# Patient Record
Sex: MALE | Race: WHITE | NOT HISPANIC OR LATINO | Employment: FULL TIME | URBAN - METROPOLITAN AREA
[De-identification: names, ages, dates, MRNs, and addresses within clinical notes are randomized per-mention and may not be internally consistent; named-entity substitution may affect disease eponyms.]

---

## 2024-03-29 DIAGNOSIS — I10 HTN (HYPERTENSION): Primary | ICD-10-CM

## 2024-03-29 RX ORDER — POTASSIUM CHLORIDE 20 MEQ/1
20 TABLET, EXTENDED RELEASE ORAL DAILY
Qty: 90 TABLET | Refills: 1 | Status: SHIPPED | OUTPATIENT
Start: 2024-03-29 | End: 2024-04-01 | Stop reason: SDUPTHER

## 2024-03-29 RX ORDER — HYDROCHLOROTHIAZIDE 25 MG/1
25 TABLET ORAL DAILY
Qty: 90 TABLET | Refills: 1 | Status: SHIPPED | OUTPATIENT
Start: 2024-03-29 | End: 2024-04-01 | Stop reason: SDUPTHER

## 2024-04-01 ENCOUNTER — OFFICE VISIT (OUTPATIENT)
Dept: PRIMARY CARE | Facility: CLINIC | Age: 64
End: 2024-04-01
Payer: COMMERCIAL

## 2024-04-01 VITALS
RESPIRATION RATE: 14 BRPM | BODY MASS INDEX: 36.29 KG/M2 | WEIGHT: 245 LBS | SYSTOLIC BLOOD PRESSURE: 145 MMHG | HEART RATE: 76 BPM | HEIGHT: 69 IN | DIASTOLIC BLOOD PRESSURE: 79 MMHG

## 2024-04-01 DIAGNOSIS — Z12.11 COLON CANCER SCREENING: ICD-10-CM

## 2024-04-01 DIAGNOSIS — E66.01 MORBID OBESITY (MULTI): ICD-10-CM

## 2024-04-01 DIAGNOSIS — I10 PRIMARY HYPERTENSION: Primary | ICD-10-CM

## 2024-04-01 DIAGNOSIS — Z72.0 TOBACCO USE: ICD-10-CM

## 2024-04-01 DIAGNOSIS — R06.83 SNORES: ICD-10-CM

## 2024-04-01 PROCEDURE — 3078F DIAST BP <80 MM HG: CPT | Performed by: FAMILY MEDICINE

## 2024-04-01 PROCEDURE — 3077F SYST BP >= 140 MM HG: CPT | Performed by: FAMILY MEDICINE

## 2024-04-01 PROCEDURE — 99214 OFFICE O/P EST MOD 30 MIN: CPT | Performed by: FAMILY MEDICINE

## 2024-04-01 RX ORDER — HYDROCHLOROTHIAZIDE 25 MG/1
25 TABLET ORAL DAILY
Qty: 90 TABLET | Refills: 1 | Status: SHIPPED | OUTPATIENT
Start: 2024-04-01 | End: 2024-05-01 | Stop reason: SDUPTHER

## 2024-04-01 RX ORDER — POTASSIUM CHLORIDE 20 MEQ/1
20 TABLET, EXTENDED RELEASE ORAL DAILY
Qty: 90 TABLET | Refills: 1 | Status: SHIPPED | OUTPATIENT
Start: 2024-04-01 | End: 2024-05-01 | Stop reason: SDUPTHER

## 2024-04-01 NOTE — ASSESSMENT & PLAN NOTE
BP has not been controlled. Start  BP pill as dir. keep a daily  bp log and bring in the log at the next office visit. Call office if BP is persistently over 130/80. DASH diet and regular exercise. Decrease calorie intake to lose wt.

## 2024-04-01 NOTE — PROGRESS NOTES
"Subjective   Patient ID: Jeb Lobo is a 63 y.o. male who presents for fu    HPI   Recent bp was elevated at 160's systolic. No CP, HA, dizziness, heart palpitation. No claudication or cold LE.  Stable  LE edema. No imbalance or falls. Good mood. Pt snores at night. Pt cont to smoke. No cough, sob or hemoptysis    Review of Systems    Objective   /79   Pulse 76   Resp 14   Ht 1.753 m (5' 9\")   Wt 111 kg (245 lb)   BMI 36.18 kg/m²     Physical Exam  NAD, well groomed, No sclera icterus. neck: supple, no cervical or axillary lymphadenopathy,  lungs: CTA b/l, heart: RRR, No LE edema, normal pedal pulses, abd: soft, no tenderness, BS+,   Good balance. CNII-XII were grossly intact, good judgment and memory. No depressed mood.   Assessment/Plan   Problem List Items Addressed This Visit             ICD-10-CM    Primary hypertension - Primary I10     BP has not been controlled. Start  BP pill as dir. keep a daily  bp log and bring in the log at the next office visit. Call office if BP is persistently over 130/80. DASH diet and regular exercise. Decrease calorie intake to lose wt.             Relevant Medications    hydroCHLOROthiazide (HYDRODiuril) 25 mg tablet    potassium chloride CR 20 mEq ER tablet    Other Relevant Orders    CBC    Comprehensive Metabolic Panel    Lipid Panel    TSH with reflex to Free T4 if abnormal    Urinalysis with Reflex Microscopic    ECG 12 lead (Ancillary Performed)    Tobacco use Z72.0     Nicotine dependence counseling: patient  smokes cigarettes. Pt declined ldct.  I discussed with patient that  tobacco addiction increases the risks of COPD (emphysema), heart attack, stroke, Peripheral artery disease, and cancer etc. Treatment options such as behavioral counseling (specialty clinic, smoking cessation program, 1-800-QUIT-NOW) and medications (Zyban, chantix and Nicotine replacement therapy) were  discussed with the patient who is considering to quit. Nicotine withdrawal symptoms " such as  increased appetite and weight gain, changes in mood (dysphoria or depression), insomnia, irritability, anxiety, difficulty concentrating and restlessness were discussed with patient. Inform patient that Nicotine withdrawal symptoms peak in the first three days of quitting and subside over three to four weeks.          Morbid obesity (CMS/HCC) E66.01     Recommend regular aerobic exercise with low fat and low cholesterol diet. Will monitor weight, blood glucose and cholesterol regularly. Pt declined bariatric evaluation           Snores R06.83    Relevant Orders    Referral to Adult Sleep Medicine     Other Visit Diagnoses         Codes    Colon cancer screening     Z12.11    Relevant Orders    Fecal Occult Blood Immunoassy

## 2024-04-01 NOTE — ASSESSMENT & PLAN NOTE
Nicotine dependence counseling: patient  smokes cigarettes. Pt declined ldct.  I discussed with patient that  tobacco addiction increases the risks of COPD (emphysema), heart attack, stroke, Peripheral artery disease, and cancer etc. Treatment options such as behavioral counseling (specialty clinic, smoking cessation program, 1-800-QUIT-NOW) and medications (Zyban, chantix and Nicotine replacement therapy) were  discussed with the patient who is considering to quit. Nicotine withdrawal symptoms such as  increased appetite and weight gain, changes in mood (dysphoria or depression), insomnia, irritability, anxiety, difficulty concentrating and restlessness were discussed with patient. Inform patient that Nicotine withdrawal symptoms peak in the first three days of quitting and subside over three to four weeks.

## 2024-04-04 ENCOUNTER — LAB (OUTPATIENT)
Dept: LAB | Facility: LAB | Age: 64
End: 2024-04-04
Payer: COMMERCIAL

## 2024-04-04 DIAGNOSIS — I10 PRIMARY HYPERTENSION: ICD-10-CM

## 2024-04-04 LAB
ALBUMIN SERPL BCP-MCNC: 4.4 G/DL (ref 3.4–5)
ALP SERPL-CCNC: 79 U/L (ref 33–136)
ALT SERPL W P-5'-P-CCNC: 24 U/L (ref 10–52)
ANION GAP SERPL CALC-SCNC: 12 MMOL/L (ref 10–20)
APPEARANCE UR: CLEAR
AST SERPL W P-5'-P-CCNC: 16 U/L (ref 9–39)
BILIRUB SERPL-MCNC: 0.6 MG/DL (ref 0–1.2)
BILIRUB UR STRIP.AUTO-MCNC: NEGATIVE MG/DL
BUN SERPL-MCNC: 19 MG/DL (ref 6–23)
CALCIUM SERPL-MCNC: 9.5 MG/DL (ref 8.6–10.3)
CHLORIDE SERPL-SCNC: 100 MMOL/L (ref 98–107)
CHOLEST SERPL-MCNC: 191 MG/DL (ref 0–199)
CHOLESTEROL/HDL RATIO: 5
CO2 SERPL-SCNC: 28 MMOL/L (ref 21–32)
COLOR UR: YELLOW
CREAT SERPL-MCNC: 1.28 MG/DL (ref 0.5–1.3)
EGFRCR SERPLBLD CKD-EPI 2021: 62 ML/MIN/1.73M*2
ERYTHROCYTE [DISTWIDTH] IN BLOOD BY AUTOMATED COUNT: 15.3 % (ref 11.5–14.5)
GLUCOSE SERPL-MCNC: 100 MG/DL (ref 74–99)
GLUCOSE UR STRIP.AUTO-MCNC: NEGATIVE MG/DL
HCT VFR BLD AUTO: 56.9 % (ref 41–52)
HDLC SERPL-MCNC: 38.5 MG/DL
HGB BLD-MCNC: 19.8 G/DL (ref 13.5–17.5)
KETONES UR STRIP.AUTO-MCNC: NEGATIVE MG/DL
LDLC SERPL CALC-MCNC: 123 MG/DL
LEUKOCYTE ESTERASE UR QL STRIP.AUTO: NEGATIVE
MCH RBC QN AUTO: 31.5 PG (ref 26–34)
MCHC RBC AUTO-ENTMCNC: 34.8 G/DL (ref 32–36)
MCV RBC AUTO: 91 FL (ref 80–100)
NITRITE UR QL STRIP.AUTO: NEGATIVE
NON HDL CHOLESTEROL: 153 MG/DL (ref 0–149)
NRBC BLD-RTO: 0 /100 WBCS (ref 0–0)
PH UR STRIP.AUTO: 5 [PH]
PLATELET # BLD AUTO: 266 X10*3/UL (ref 150–450)
POTASSIUM SERPL-SCNC: 4.1 MMOL/L (ref 3.5–5.3)
PROT SERPL-MCNC: 7.2 G/DL (ref 6.4–8.2)
PROT UR STRIP.AUTO-MCNC: NEGATIVE MG/DL
RBC # BLD AUTO: 6.28 X10*6/UL (ref 4.5–5.9)
RBC # UR STRIP.AUTO: NEGATIVE /UL
SODIUM SERPL-SCNC: 136 MMOL/L (ref 136–145)
SP GR UR STRIP.AUTO: 1.02
TRIGL SERPL-MCNC: 149 MG/DL (ref 0–149)
TSH SERPL-ACNC: 1.5 MIU/L (ref 0.44–3.98)
UROBILINOGEN UR STRIP.AUTO-MCNC: <2 MG/DL
VLDL: 30 MG/DL (ref 0–40)
WBC # BLD AUTO: 11.2 X10*3/UL (ref 4.4–11.3)

## 2024-04-04 PROCEDURE — 36415 COLL VENOUS BLD VENIPUNCTURE: CPT

## 2024-04-04 PROCEDURE — 80053 COMPREHEN METABOLIC PANEL: CPT

## 2024-04-04 PROCEDURE — 85027 COMPLETE CBC AUTOMATED: CPT

## 2024-04-04 PROCEDURE — 80061 LIPID PANEL: CPT

## 2024-04-04 PROCEDURE — 84443 ASSAY THYROID STIM HORMONE: CPT

## 2024-04-04 PROCEDURE — 81003 URINALYSIS AUTO W/O SCOPE: CPT

## 2024-04-08 ENCOUNTER — OFFICE VISIT (OUTPATIENT)
Dept: PRIMARY CARE | Facility: CLINIC | Age: 64
End: 2024-04-08
Payer: COMMERCIAL

## 2024-04-08 ENCOUNTER — APPOINTMENT (OUTPATIENT)
Dept: PRIMARY CARE | Facility: CLINIC | Age: 64
End: 2024-04-08
Payer: COMMERCIAL

## 2024-04-08 VITALS — HEART RATE: 72 BPM | DIASTOLIC BLOOD PRESSURE: 81 MMHG | SYSTOLIC BLOOD PRESSURE: 135 MMHG

## 2024-04-08 DIAGNOSIS — D75.1 ERYTHROCYTOSIS: ICD-10-CM

## 2024-04-08 DIAGNOSIS — E78.00 PURE HYPERCHOLESTEROLEMIA: Primary | ICD-10-CM

## 2024-04-08 PROCEDURE — 99214 OFFICE O/P EST MOD 30 MIN: CPT | Performed by: FAMILY MEDICINE

## 2024-04-08 PROCEDURE — 3079F DIAST BP 80-89 MM HG: CPT | Performed by: FAMILY MEDICINE

## 2024-04-08 PROCEDURE — 3075F SYST BP GE 130 - 139MM HG: CPT | Performed by: FAMILY MEDICINE

## 2024-04-08 RX ORDER — ROSUVASTATIN CALCIUM 40 MG/1
40 TABLET, COATED ORAL DAILY
Qty: 100 TABLET | Refills: 3 | Status: SHIPPED | OUTPATIENT
Start: 2024-04-08 | End: 2025-05-13

## 2024-04-08 NOTE — ASSESSMENT & PLAN NOTE
Hyperlipidemia start  statin. No GI upset or muscle ache. Will monitor labs for evaluation.  Health diet and regular exercise. Decrease calorie intake to lose wt.  f/u in 3 mos.

## 2024-04-08 NOTE — PROGRESS NOTES
Subjective   Patient ID: Jeb Lobo is a 63 y.o. male who presents for lab fu    HPI   H/H was elevated. LDL was high too. No sob, cough, CP, HA, dizziness, heart palpitation. No claudication or cold LE.  No LE edema. No imbalance or falls. Good mood.     Review of Systems    Objective   /81   Pulse 72     Physical Exam  NAD, well groomed, no cervical or axillary lymphadenopathy,  lungs: CTA b/l, heart: RRR, No LE edema, normal pedal pulses, abd: soft, no tenderness, BS+,  Good balance. CNII-XII were grossly intact, good mood.    Assessment/Plan   Problem List Items Addressed This Visit             ICD-10-CM    Pure hypercholesterolemia - Primary E78.00     Hyperlipidemia start  statin. No GI upset or muscle ache. Will monitor labs for evaluation.  Health diet and regular exercise. Decrease calorie intake to lose wt.  f/u in 3 mos.            Relevant Medications    rosuvastatin (Crestor) 40 mg tablet    Erythrocytosis D75.1     H/H was higher then before. Dc smoking. Hematology eval         Relevant Orders    Referral to Hematology and Oncology

## 2024-05-01 ENCOUNTER — OFFICE VISIT (OUTPATIENT)
Dept: PRIMARY CARE | Facility: CLINIC | Age: 64
End: 2024-05-01
Payer: COMMERCIAL

## 2024-05-01 VITALS — HEART RATE: 78 BPM | DIASTOLIC BLOOD PRESSURE: 76 MMHG | SYSTOLIC BLOOD PRESSURE: 132 MMHG

## 2024-05-01 DIAGNOSIS — I10 PRIMARY HYPERTENSION: Primary | ICD-10-CM

## 2024-05-01 DIAGNOSIS — Z12.11 COLON CANCER SCREENING: ICD-10-CM

## 2024-05-01 PROCEDURE — 3075F SYST BP GE 130 - 139MM HG: CPT | Performed by: FAMILY MEDICINE

## 2024-05-01 PROCEDURE — 99213 OFFICE O/P EST LOW 20 MIN: CPT | Performed by: FAMILY MEDICINE

## 2024-05-01 PROCEDURE — 3078F DIAST BP <80 MM HG: CPT | Performed by: FAMILY MEDICINE

## 2024-05-01 RX ORDER — HYDROCHLOROTHIAZIDE 25 MG/1
25 TABLET ORAL DAILY
Qty: 90 TABLET | Refills: 1 | Status: SHIPPED | OUTPATIENT
Start: 2024-05-01

## 2024-05-01 RX ORDER — POTASSIUM CHLORIDE 20 MEQ/1
20 TABLET, EXTENDED RELEASE ORAL DAILY
Qty: 90 TABLET | Refills: 1 | Status: SHIPPED | OUTPATIENT
Start: 2024-05-01

## 2024-05-01 NOTE — PROGRESS NOTES
Subjective   Patient ID: Jeb Lobo is a 63 y.o. male who presents for bp fu    HPI   Patient has been compliant with taking all  current medications.No CP, HA, dizziness, heart palpitation. No claudication or cold LE.  No LE edema.   Review of Systems    Objective   /76   Pulse 78     Physical Exam  NAD, well groomed, No sclera icterus.   lungs: CTA b/l, heart: RRR, No LE edema, normal pedal pulses, abd: soft, no tenderness, BS+,Good balance. Assessment/Plan   Problem List Items Addressed This Visit             ICD-10-CM    Primary hypertension I10      DASH diet and regular exercise. Decrease calorie intake to lose wt.   Fu in 3 mos           Relevant Medications    hydroCHLOROthiazide (HYDRODiuril) 25 mg tablet    potassium chloride CR 20 mEq ER tablet    Other Relevant Orders    CBC     Other Visit Diagnoses         Codes    Colon cancer screening    -  Primary Z12.11    Relevant Orders    Cologuard® colon cancer screening

## 2024-05-02 ENCOUNTER — APPOINTMENT (OUTPATIENT)
Dept: PRIMARY CARE | Facility: CLINIC | Age: 64
End: 2024-05-02
Payer: COMMERCIAL

## 2024-05-06 ENCOUNTER — LAB (OUTPATIENT)
Dept: LAB | Facility: LAB | Age: 64
End: 2024-05-06
Payer: COMMERCIAL

## 2024-05-06 DIAGNOSIS — I10 PRIMARY HYPERTENSION: ICD-10-CM

## 2024-05-06 LAB
ERYTHROCYTE [DISTWIDTH] IN BLOOD BY AUTOMATED COUNT: 13.9 % (ref 11.5–14.5)
HCT VFR BLD AUTO: 55.4 % (ref 41–52)
HGB BLD-MCNC: 18.3 G/DL (ref 13.5–17.5)
MCH RBC QN AUTO: 30.2 PG (ref 26–34)
MCHC RBC AUTO-ENTMCNC: 33 G/DL (ref 32–36)
MCV RBC AUTO: 91 FL (ref 80–100)
NRBC BLD-RTO: 0 /100 WBCS (ref 0–0)
PLATELET # BLD AUTO: 291 X10*3/UL (ref 150–450)
RBC # BLD AUTO: 6.06 X10*6/UL (ref 4.5–5.9)
WBC # BLD AUTO: 11.8 X10*3/UL (ref 4.4–11.3)

## 2024-05-06 PROCEDURE — 36415 COLL VENOUS BLD VENIPUNCTURE: CPT

## 2024-05-06 PROCEDURE — 85027 COMPLETE CBC AUTOMATED: CPT

## 2024-06-14 ENCOUNTER — TELEPHONE (OUTPATIENT)
Dept: PRIMARY CARE | Facility: CLINIC | Age: 64
End: 2024-06-14
Payer: COMMERCIAL

## 2024-06-14 DIAGNOSIS — Z72.0 TOBACCO USE: Primary | ICD-10-CM

## 2024-06-14 RX ORDER — IBUPROFEN 200 MG
1 TABLET ORAL EVERY 24 HOURS
Qty: 30 PATCH | Refills: 0 | Status: SHIPPED | OUTPATIENT
Start: 2024-06-14 | End: 2024-07-14

## 2024-06-28 ENCOUNTER — APPOINTMENT (OUTPATIENT)
Dept: HEMATOLOGY/ONCOLOGY | Facility: CLINIC | Age: 64
End: 2024-06-28
Payer: COMMERCIAL

## 2024-07-08 ENCOUNTER — APPOINTMENT (OUTPATIENT)
Dept: PRIMARY CARE | Facility: CLINIC | Age: 64
End: 2024-07-08
Payer: COMMERCIAL

## 2024-07-08 VITALS — DIASTOLIC BLOOD PRESSURE: 65 MMHG | SYSTOLIC BLOOD PRESSURE: 128 MMHG | HEART RATE: 72 BPM

## 2024-07-08 DIAGNOSIS — Z72.0 TOBACCO USE: ICD-10-CM

## 2024-07-08 DIAGNOSIS — D75.1 ERYTHROCYTOSIS: ICD-10-CM

## 2024-07-08 DIAGNOSIS — I10 PRIMARY HYPERTENSION: ICD-10-CM

## 2024-07-08 DIAGNOSIS — E78.00 PURE HYPERCHOLESTEROLEMIA: Primary | ICD-10-CM

## 2024-07-08 PROCEDURE — 3074F SYST BP LT 130 MM HG: CPT | Performed by: FAMILY MEDICINE

## 2024-07-08 PROCEDURE — 99214 OFFICE O/P EST MOD 30 MIN: CPT | Performed by: FAMILY MEDICINE

## 2024-07-08 PROCEDURE — 3078F DIAST BP <80 MM HG: CPT | Performed by: FAMILY MEDICINE

## 2024-07-08 RX ORDER — HYDROCHLOROTHIAZIDE 25 MG/1
25 TABLET ORAL DAILY
Qty: 90 TABLET | Refills: 1 | Status: SHIPPED | OUTPATIENT
Start: 2024-07-08

## 2024-07-08 RX ORDER — POTASSIUM CHLORIDE 20 MEQ/1
20 TABLET, EXTENDED RELEASE ORAL DAILY
Qty: 90 TABLET | Refills: 1 | Status: SHIPPED | OUTPATIENT
Start: 2024-07-08

## 2024-07-08 NOTE — ASSESSMENT & PLAN NOTE
Hyperlipidemia, not  on statin. Will monitor labs for evaluation.  Health diet and regular exercise. Decrease calorie intake to lose wt.  f/u in 3 mos.

## 2024-07-08 NOTE — PROGRESS NOTES
Subjective   Patient ID: Jeb Lobo is a 63 y.o. male who presents for fu    HPI   Patient has been compliant with taking all  current medications except crestor.  Normal appetite. Pt cont to smoke. No sob, cough, CP, HA, dizziness, heart palpitation. No claudication or cold LE.  No LE edema. No imbalance or falls. Good mood.     Review of Systems    Objective   /65   Pulse 72     Physical Exam  NAD, well groomed, No sclera icterus.  no cervical or axillary lymphadenopathy,  lungs: CTA b/l, heart: RRR, No LE edema, normal pedal pulses, abd: soft, no tenderness, BS+, normal strength and sensation  at bilateral lower extremities. No skin lesions at bilateral feet.  Good balance. CNII-XII were grossly intact, good judgment and memory. No depressed mood.    Assessment/Plan   Problem List Items Addressed This Visit             ICD-10-CM    Primary hypertension I10     BP has been controlled. Continue BP pills. keep a daily  bp log and bring in the log at the next office visit. Call office if BP is persistently over 130/80. DASH diet and regular exercise. Decrease calorie intake to lose wt.             Relevant Medications    hydroCHLOROthiazide (HYDRODiuril) 25 mg tablet    potassium chloride CR 20 mEq ER tablet    Tobacco use Z72.0     Nicotine dependence counseling: patient  smokes cigarettes.  I discussed with patient that  tobacco addiction increases the risks of COPD (emphysema), heart attack, stroke, Peripheral artery disease, and cancer etc. Treatment options such as behavioral counseling (specialty clinic, smoking cessation program, 1-800-QUIT-NOW) and medications (Zyban, chantix and Nicotine replacement therapy) were  discussed with the patient who is considering to quit.          Pure hypercholesterolemia - Primary E78.00     Hyperlipidemia, not  on statin. Will monitor labs for evaluation.  Health diet and regular exercise. Decrease calorie intake to lose wt.  f/u in 3 mos.            Relevant Orders     Lipid Panel    Comprehensive Metabolic Panel    Erythrocytosis D75.1     Dc smoking. Will monitor cbc         Relevant Orders    CBC

## 2024-07-08 NOTE — ASSESSMENT & PLAN NOTE
Nicotine dependence counseling: patient  smokes cigarettes.  I discussed with patient that  tobacco addiction increases the risks of COPD (emphysema), heart attack, stroke, Peripheral artery disease, and cancer etc. Treatment options such as behavioral counseling (specialty clinic, smoking cessation program, 1-800-QUIT-NOW) and medications (Zyban, chantix and Nicotine replacement therapy) were  discussed with the patient who is considering to quit.

## 2024-07-25 ENCOUNTER — OFFICE VISIT (OUTPATIENT)
Dept: HEMATOLOGY/ONCOLOGY | Facility: CLINIC | Age: 64
End: 2024-07-25
Payer: COMMERCIAL

## 2024-07-25 VITALS
RESPIRATION RATE: 16 BRPM | SYSTOLIC BLOOD PRESSURE: 137 MMHG | BODY MASS INDEX: 36.31 KG/M2 | HEART RATE: 88 BPM | DIASTOLIC BLOOD PRESSURE: 91 MMHG | OXYGEN SATURATION: 96 % | TEMPERATURE: 97.5 F | WEIGHT: 245.15 LBS | HEIGHT: 69 IN

## 2024-07-25 DIAGNOSIS — D75.1 ERYTHROCYTOSIS: Primary | ICD-10-CM

## 2024-07-25 LAB
ALBUMIN SERPL BCP-MCNC: 4.1 G/DL (ref 3.4–5)
ALP SERPL-CCNC: 71 U/L (ref 33–136)
ALT SERPL W P-5'-P-CCNC: 20 U/L (ref 10–52)
ANION GAP SERPL CALC-SCNC: 11 MMOL/L (ref 10–20)
AST SERPL W P-5'-P-CCNC: 15 U/L (ref 9–39)
BASOPHILS # BLD AUTO: 0.07 X10*3/UL (ref 0–0.1)
BASOPHILS NFR BLD AUTO: 0.6 %
BILIRUB SERPL-MCNC: 0.5 MG/DL (ref 0–1.2)
BUN SERPL-MCNC: 20 MG/DL (ref 6–23)
CALCIUM SERPL-MCNC: 9 MG/DL (ref 8.6–10.3)
CHLORIDE SERPL-SCNC: 101 MMOL/L (ref 98–107)
CO2 SERPL-SCNC: 26 MMOL/L (ref 21–32)
CREAT SERPL-MCNC: 1.23 MG/DL (ref 0.5–1.3)
EGFRCR SERPLBLD CKD-EPI 2021: 66 ML/MIN/1.73M*2
EOSINOPHIL # BLD AUTO: 0.28 X10*3/UL (ref 0–0.7)
EOSINOPHIL NFR BLD AUTO: 2.5 %
ERYTHROCYTE [DISTWIDTH] IN BLOOD BY AUTOMATED COUNT: 14.7 % (ref 11.5–14.5)
FERRITIN SERPL-MCNC: 299 NG/ML (ref 20–300)
GLUCOSE SERPL-MCNC: 99 MG/DL (ref 74–99)
HCT VFR BLD AUTO: 52.9 % (ref 41–52)
HGB BLD-MCNC: 17.8 G/DL (ref 13.5–17.5)
IGA SERPL-MCNC: 199 MG/DL (ref 70–400)
IGG SERPL-MCNC: 1250 MG/DL (ref 700–1600)
IGM SERPL-MCNC: 36 MG/DL (ref 40–230)
IMM GRANULOCYTES # BLD AUTO: 0.1 X10*3/UL (ref 0–0.7)
IMM GRANULOCYTES NFR BLD AUTO: 0.9 % (ref 0–0.9)
IRON SATN MFR SERPL: 24 % (ref 25–45)
IRON SERPL-MCNC: 72 UG/DL (ref 35–150)
LDH SERPL L TO P-CCNC: 158 U/L (ref 84–246)
LYMPHOCYTES # BLD AUTO: 3.57 X10*3/UL (ref 1.2–4.8)
LYMPHOCYTES NFR BLD AUTO: 31.5 %
MCH RBC QN AUTO: 31 PG (ref 26–34)
MCHC RBC AUTO-ENTMCNC: 33.6 G/DL (ref 32–36)
MCV RBC AUTO: 92 FL (ref 80–100)
MONOCYTES # BLD AUTO: 1.17 X10*3/UL (ref 0.1–1)
MONOCYTES NFR BLD AUTO: 10.3 %
NEUTROPHILS # BLD AUTO: 6.15 X10*3/UL (ref 1.2–7.7)
NEUTROPHILS NFR BLD AUTO: 54.2 %
PLATELET # BLD AUTO: 234 X10*3/UL (ref 150–450)
POTASSIUM SERPL-SCNC: 4 MMOL/L (ref 3.5–5.3)
PROT SERPL-MCNC: 7.2 G/DL (ref 6.4–8.2)
PROT SERPL-MCNC: 7.3 G/DL (ref 6.4–8.2)
RBC # BLD AUTO: 5.75 X10*6/UL (ref 4.5–5.9)
SODIUM SERPL-SCNC: 134 MMOL/L (ref 136–145)
TIBC SERPL-MCNC: 295 UG/DL (ref 240–445)
UIBC SERPL-MCNC: 223 UG/DL (ref 110–370)
VIT B12 SERPL-MCNC: 556 PG/ML (ref 211–911)
WBC # BLD AUTO: 11.3 X10*3/UL (ref 4.4–11.3)

## 2024-07-25 PROCEDURE — 3080F DIAST BP >= 90 MM HG: CPT | Performed by: PHYSICIAN ASSISTANT

## 2024-07-25 PROCEDURE — 81450 HL NEO GSAP 5-50DNA/DNA&RNA: CPT | Performed by: PHYSICIAN ASSISTANT

## 2024-07-25 PROCEDURE — 80053 COMPREHEN METABOLIC PANEL: CPT | Performed by: PHYSICIAN ASSISTANT

## 2024-07-25 PROCEDURE — 85025 COMPLETE CBC W/AUTO DIFF WBC: CPT | Performed by: PHYSICIAN ASSISTANT

## 2024-07-25 PROCEDURE — 82728 ASSAY OF FERRITIN: CPT | Performed by: PHYSICIAN ASSISTANT

## 2024-07-25 PROCEDURE — 86038 ANTINUCLEAR ANTIBODIES: CPT | Mod: GEALAB | Performed by: PHYSICIAN ASSISTANT

## 2024-07-25 PROCEDURE — 83521 IG LIGHT CHAINS FREE EACH: CPT | Mod: GEALAB | Performed by: PHYSICIAN ASSISTANT

## 2024-07-25 PROCEDURE — 36415 COLL VENOUS BLD VENIPUNCTURE: CPT | Performed by: PHYSICIAN ASSISTANT

## 2024-07-25 PROCEDURE — 99205 OFFICE O/P NEW HI 60 MIN: CPT | Performed by: PHYSICIAN ASSISTANT

## 2024-07-25 PROCEDURE — 99215 OFFICE O/P EST HI 40 MIN: CPT | Performed by: PHYSICIAN ASSISTANT

## 2024-07-25 PROCEDURE — 3075F SYST BP GE 130 - 139MM HG: CPT | Performed by: PHYSICIAN ASSISTANT

## 2024-07-25 PROCEDURE — 82784 ASSAY IGA/IGD/IGG/IGM EACH: CPT | Mod: GEALAB | Performed by: PHYSICIAN ASSISTANT

## 2024-07-25 PROCEDURE — 82607 VITAMIN B-12: CPT | Mod: GEALAB | Performed by: PHYSICIAN ASSISTANT

## 2024-07-25 PROCEDURE — 88185 FLOWCYTOMETRY/TC ADD-ON: CPT | Mod: TC | Performed by: PHYSICIAN ASSISTANT

## 2024-07-25 PROCEDURE — 84155 ASSAY OF PROTEIN SERUM: CPT | Mod: GEALAB | Performed by: PHYSICIAN ASSISTANT

## 2024-07-25 PROCEDURE — 3008F BODY MASS INDEX DOCD: CPT | Performed by: PHYSICIAN ASSISTANT

## 2024-07-25 PROCEDURE — 86334 IMMUNOFIX E-PHORESIS SERUM: CPT | Mod: GEALAB | Performed by: PHYSICIAN ASSISTANT

## 2024-07-25 PROCEDURE — 83615 LACTATE (LD) (LDH) ENZYME: CPT | Performed by: PHYSICIAN ASSISTANT

## 2024-07-25 PROCEDURE — 82668 ASSAY OF ERYTHROPOIETIN: CPT | Performed by: PHYSICIAN ASSISTANT

## 2024-07-25 PROCEDURE — 81256 HFE GENE: CPT | Performed by: PHYSICIAN ASSISTANT

## 2024-07-25 PROCEDURE — 83540 ASSAY OF IRON: CPT | Performed by: PHYSICIAN ASSISTANT

## 2024-07-25 ASSESSMENT — PAIN SCALES - GENERAL: PAINLEVEL: 0-NO PAIN

## 2024-07-26 LAB
KAPPA LC SERPL-MCNC: 2.4 MG/DL (ref 0.33–1.94)
KAPPA LC/LAMBDA SER: 1.64 {RATIO} (ref 0.26–1.65)
LAMBDA LC SERPL-MCNC: 1.46 MG/DL (ref 0.57–2.63)

## 2024-07-29 LAB
ALBUMIN: 4.2 G/DL (ref 3.4–5)
ALPHA 1 GLOBULIN: 0.3 G/DL (ref 0.2–0.6)
ALPHA 2 GLOBULIN: 0.8 G/DL (ref 0.4–1.1)
ANA SER QL HEP2 SUBST: NEGATIVE
BETA GLOBULIN: 0.8 G/DL (ref 0.5–1.2)
CELL COUNT (BLOOD): 11.3 X10*3/UL
CELL POPULATIONS: NORMAL
CYTOGENETICS/MOLECULAR TEST ORDERED: NORMAL
DIAGNOSIS: NORMAL
ELECTRONICALLY SIGNED BY: ABNORMAL
FLOW DIFFERENTIAL: NORMAL
FLOW TEST ORDERED: NORMAL
GAMMA GLOBULIN: 1.2 G/DL (ref 0.5–1.4)
HFE GENE MUT TESTED BLD/T: ABNORMAL
HFE P.C282Y BLD/T QL: NORMAL
HFE P.H63D BLD/T QL: ABNORMAL
IMMUNOFIXATION COMMENT: NORMAL
LAB TEST METHOD: NORMAL
NUMBER OF CELLS COLLECTED: NORMAL PER TUBE
PATH REPORT.TOTAL CANCER: NORMAL
PATH REVIEW - SERUM IMMUNOFIXATION: NORMAL
PATH REVIEW-SERUM PROTEIN ELECTROPHORESIS: NORMAL
PROTEIN ELECTROPHORESIS COMMENT: NORMAL
RBC MORPH BLD: NORMAL
SIGNATURE COMMENT: NORMAL
SPECIMEN VIABILITY: NORMAL
WBC MORPH BLD: NORMAL

## 2024-07-29 NOTE — PROGRESS NOTES
Reason for Visit  Jeb Lobo is a 63 y.o. male with medical history s/f every day smoker referred by Dr. Flynn for polycythemia.    PMH/PSH: HTN, HL, tonsillectomy.   FH: mother dies of bladder cancer (smoker)  Soc Hx: Every day smoker, denies ETOH, illicit drugs; Retired ;  with children.    History of Present Illness:  Upon review of labs, noted to have polycythemia since 1/2023, with intermittent leucocytosis with no lymphocytosis, normal platelet count,    On assessment, reports only recently established care with PCP and prior to last year didn't see provider. He is a long time smoker and recently tried to quite using Nicotine gum. He reports overall feeling well and has no complaints. Denies B symptoms, bleeding from any source, pruritus, n/v/d/abd pain, CP/SOB/MUELLER, neuropathy, rash or any other complaints at this time.    Review of Systems: All of the systems have been reviewed and are negative for complaints except what is stated in the HPI and/or past medical history.    Allergies and Intolerances:  No Known Allergies         Outpatient Medication Profile:  Current Outpatient Medications   Medication Sig Dispense Refill    hydroCHLOROthiazide (HYDRODiuril) 25 mg tablet Take 1 tablet (25 mg) by mouth once daily. 90 tablet 1    potassium chloride CR 20 mEq ER tablet Take 1 tablet (20 mEq) by mouth once daily. 90 tablet 1    rosuvastatin (Crestor) 40 mg tablet Take 1 tablet (40 mg) by mouth once daily. 100 tablet 3    nicotine (Nicoderm CQ) 14 mg/24 hr patch Place 1 patch over 24 hours on the skin once every 24 hours. 30 patch 0     No current facility-administered medications for this visit.        Vitals and Measurements:       1/27/2023     2:47 PM 1/27/2023     3:51 PM 4/1/2024     2:44 PM 4/8/2024    12:55 PM 5/1/2024     2:44 PM 7/8/2024     9:07 AM 7/25/2024    12:02 PM   Vitals   Systolic  152 145 135 132 128 137   Diastolic  88 79 81 76 65 91   Heart Rate  82 76 72 78 72 88   Temp    "    36.4 °C (97.5 °F)   Resp  14 14    16   Height (in)   1.753 m (5' 9\")    1.75 m (5' 8.9\")    Weight (lb) 243  245    245.15   BMI 35.88 kg/m2  36.18 kg/m2    36.31 kg/m2   BSA (m2) 2.31 m2  2.32 m2    2.32 m2   Visit Report   Report Report Report Report Report       Significant value     Physical Exam:   Constitutional: alert, awake and oriented, not in acute distress   HEENT: moist mucous membranes, normal nose   Neck: supple, no lymphadenopathy   EYES: PERRL, EOM intact, conjunctiva normal  Skin: no jaundice, rash or erythema  Neurological: AAOx3, no gross focal deficit   Psychiatric: normal mood and behavior     Lab Results:  Lab Results   Component Value Date    WBC 11.3 07/25/2024    NEUTROABS 6.15 07/25/2024    IGABSOL 0.10 07/25/2024    LYMPHSABS 3.57 07/25/2024    MONOSABS 1.17 (H) 07/25/2024    EOSABS 0.28 07/25/2024    BASOSABS 0.07 07/25/2024    RBC 5.75 07/25/2024    MCV 92 07/25/2024    MCHC 33.6 07/25/2024    HGB 17.8 (H) 07/25/2024    HCT 52.9 (H) 07/25/2024     07/25/2024     Lab Results   Component Value Date    CREATININE 1.23 07/25/2024    BUN 20 07/25/2024    EGFR 66 07/25/2024     (L) 07/25/2024    K 4.0 07/25/2024     07/25/2024    CO2 26 07/25/2024      Lab Results   Component Value Date    ALT 20 07/25/2024    AST 15 07/25/2024    ALKPHOS 71 07/25/2024    BILITOT 0.5 07/25/2024      Lab Results   Component Value Date    TSH 1.50 04/04/2024     Lab Results   Component Value Date    TSH 1.50 04/04/2024     Lab Results   Component Value Date    IRON 72 07/25/2024    TIBC 295 07/25/2024    FERRITIN 299 07/25/2024      Lab Results   Component Value Date    [id number] 556 07/25/2024      Lab Results   Component Value Date     07/25/2024     Lab Results   Component Value Date    IGG 1,250 07/25/2024    IGM 36 (L) 07/25/2024     07/25/2024     Assessment:    63 y.o. male with medical history s/f every day smoker referred for polycythemia.    Plan:    Reviewed and " discussed lab, imaging, and pathology results with patient in detail as well as diagnosis, prognosis, and treatment options.    Discussed r/o MPN and HH     Discussed role of BMBx    Discussed if above negative then likely secondary polycythemia 2/2 smoking    Encourage smoking cessation.    F/U w/PCP    RTC in 2-3 weels    Patient verbalized understanding, and all his questions were answered to his satisfaction    60 min spent with patient greater than 50 % of which was spent in consultation and coordination of care.

## 2024-07-31 LAB
ELECTRONICALLY SIGNED BY: NORMAL
EPO SERPL-ACNC: 7 MU/ML (ref 4–27)
MYELOID NGS RESULTS: NORMAL

## 2024-08-06 LAB
CHROM ANALY OVERALL INTERP-IMP: NORMAL
ELECTRONICALLY SIGNED BY CYTOGENETICS: NORMAL

## 2024-08-08 ENCOUNTER — OFFICE VISIT (OUTPATIENT)
Dept: HEMATOLOGY/ONCOLOGY | Facility: CLINIC | Age: 64
End: 2024-08-08
Payer: COMMERCIAL

## 2024-08-08 VITALS
OXYGEN SATURATION: 95 % | DIASTOLIC BLOOD PRESSURE: 103 MMHG | WEIGHT: 244.71 LBS | SYSTOLIC BLOOD PRESSURE: 149 MMHG | HEART RATE: 87 BPM | TEMPERATURE: 98.4 F | RESPIRATION RATE: 17 BRPM | BODY MASS INDEX: 36.24 KG/M2

## 2024-08-08 DIAGNOSIS — D75.1 ERYTHROCYTOSIS: ICD-10-CM

## 2024-08-08 PROCEDURE — 99214 OFFICE O/P EST MOD 30 MIN: CPT | Performed by: PHYSICIAN ASSISTANT

## 2024-08-08 PROCEDURE — 3077F SYST BP >= 140 MM HG: CPT | Performed by: PHYSICIAN ASSISTANT

## 2024-08-08 PROCEDURE — 3080F DIAST BP >= 90 MM HG: CPT | Performed by: PHYSICIAN ASSISTANT

## 2024-08-08 ASSESSMENT — PATIENT HEALTH QUESTIONNAIRE - PHQ9
2. FEELING DOWN, DEPRESSED OR HOPELESS: NOT AT ALL
SUM OF ALL RESPONSES TO PHQ9 QUESTIONS 1 AND 2: 0
1. LITTLE INTEREST OR PLEASURE IN DOING THINGS: NOT AT ALL

## 2024-08-08 ASSESSMENT — COLUMBIA-SUICIDE SEVERITY RATING SCALE - C-SSRS
6. HAVE YOU EVER DONE ANYTHING, STARTED TO DO ANYTHING, OR PREPARED TO DO ANYTHING TO END YOUR LIFE?: NO
2. HAVE YOU ACTUALLY HAD ANY THOUGHTS OF KILLING YOURSELF?: NO
1. IN THE PAST MONTH, HAVE YOU WISHED YOU WERE DEAD OR WISHED YOU COULD GO TO SLEEP AND NOT WAKE UP?: NO

## 2024-08-08 ASSESSMENT — PAIN SCALES - GENERAL: PAINLEVEL: 0-NO PAIN

## 2024-08-08 NOTE — PROGRESS NOTES
Reason for Visit  Jeb Lobo is a 63 y.o. male with medical history s/f every day smoker referred by Dr. Flynn for polycythemia.    Upon review of labs, noted to have polycythemia since 1/2023, with intermittent leucocytosis with no lymphocytosis, normal platelet count,    On assessment, reports only recently established care with PCP and prior to last year didn't see provider. He is a long time smoker and recently tried to quite using Nicotine gum. He reports overall feeling well and has no complaints. Denies B symptoms, bleeding from any source, pruritus, n/v/d/abd pain, CP/SOB/MUELLER, neuropathy, rash or any other complaints at this time.    PMH/PSH: HTN, HL, tonsillectomy.   FH: mother dies of bladder cancer (smoker)  Soc Hx: Every day smoker, denies ETOH, illicit drugs; Retired ;  with children.    History of Present Illness:  No new complaints.    Review of Systems: All of the systems have been reviewed and are negative for complaints except what is stated in the HPI and/or past medical history.    Allergies and Intolerances:  No Known Allergies         Outpatient Medication Profile:  Current Outpatient Medications   Medication Sig Dispense Refill    hydroCHLOROthiazide (HYDRODiuril) 25 mg tablet Take 1 tablet (25 mg) by mouth once daily. 90 tablet 1    nicotine (Nicoderm CQ) 14 mg/24 hr patch Place 1 patch over 24 hours on the skin once every 24 hours. 30 patch 0    potassium chloride CR 20 mEq ER tablet Take 1 tablet (20 mEq) by mouth once daily. 90 tablet 1    rosuvastatin (Crestor) 40 mg tablet Take 1 tablet (40 mg) by mouth once daily. 100 tablet 3     No current facility-administered medications for this visit.        Vitals and Measurements:       1/27/2023     3:51 PM 4/1/2024     2:44 PM 4/8/2024    12:55 PM 5/1/2024     2:44 PM 7/8/2024     9:07 AM 7/25/2024    12:02 PM 8/8/2024    12:41 PM   Vitals   Systolic 152 145 135 132 128 137 149   Diastolic 88 79 81 76 65 91 103   Heart Rate  "82 76 72 78 72 88 87   Temp      36.4 °C (97.5 °F) 36.9 °C (98.4 °F)   Resp 14 14    16 17   Height (in)  1.753 m (5' 9\")    1.75 m (5' 8.9\")     Weight (lb)  245    245.15 244.71   BMI  36.18 kg/m2    36.31 kg/m2 36.24 kg/m2   BSA (m2)  2.32 m2    2.32 m2 2.32 m2   Visit Report  Report Report Report Report Report Report       Significant value     Physical Exam:   Constitutional: alert, awake and oriented, not in acute distress   HEENT: moist mucous membranes, normal nose   Neck: supple, no lymphadenopathy   EYES: PERRL, EOM intact, conjunctiva normal  Skin: no jaundice, rash or erythema  Neurological: AAOx3, no gross focal deficit   Psychiatric: normal mood and behavior     Lab Results:  Office Visit on 07/25/2024   Component Date Value Ref Range Status    Glucose 07/25/2024 99  74 - 99 mg/dL Final    Sodium 07/25/2024 134 (L)  136 - 145 mmol/L Final    Potassium 07/25/2024 4.0  3.5 - 5.3 mmol/L Final    Chloride 07/25/2024 101  98 - 107 mmol/L Final    Bicarbonate 07/25/2024 26  21 - 32 mmol/L Final    Anion Gap 07/25/2024 11  10 - 20 mmol/L Final    Urea Nitrogen 07/25/2024 20  6 - 23 mg/dL Final    Creatinine 07/25/2024 1.23  0.50 - 1.30 mg/dL Final    eGFR 07/25/2024 66  >60 mL/min/1.73m*2 Final    Calculations of estimated GFR are performed using the 2021 CKD-EPI Study Refit equation without the race variable for the IDMS-Traceable creatinine methods.  https://jasn.asnjournals.org/content/early/2021/09/22/ASN.0815303179    Calcium 07/25/2024 9.0  8.6 - 10.3 mg/dL Final    Albumin 07/25/2024 4.1  3.4 - 5.0 g/dL Final    Alkaline Phosphatase 07/25/2024 71  33 - 136 U/L Final    Total Protein 07/25/2024 7.2  6.4 - 8.2 g/dL Final    AST 07/25/2024 15  9 - 39 U/L Final    Bilirubin, Total 07/25/2024 0.5  0.0 - 1.2 mg/dL Final    ALT 07/25/2024 20  10 - 52 U/L Final    Patients treated with Sulfasalazine may generate falsely decreased results for ALT.    Iron 07/25/2024 72  35 - 150 ug/dL Final    UIBC 07/25/2024 " 223  110 - 370 ug/dL Final    TIBC 07/25/2024 295  240 - 445 ug/dL Final    % Saturation 07/25/2024 24 (L)  25 - 45 % Final    Ferritin 07/25/2024 299  20 - 300 ng/mL Final    Erythropoietin 07/25/2024 7  4 - 27 mU/mL Final    Comment: INTERPRETIVE INFORMATION: Erythropoietin  Normal serum concentrations of erythropoietin for 95% of   individuals with normal hematocrits range from 4-27 mU/mL.    As the hematocrit is lowered by iron deficiency, aplastic, or   hemolytic anemia, the concentration of erythropoietin increases as   shown in the graph below. In the absence of anemia, elevated   concentrations are seen in renal tumors, as a manifestation of   renal transplant rejection, and in secondary polycythemia. Low   values may be observed in hemochromatosis.          Expected Erythropoietin Concentrations in Patients                     with Uncomplicated Anemia       Erythropoietin (mU/mL)                100,000 - +                          +                 10,000 - +.......                          + .......                  1,000 - +    .......                          +     ........                    100 - +       ........                          +        ........                     10 - +          ........                                                     +---+---+---+---+---+---+                         10   20  30  40  50  60  70                                (Hematocrit %)              (Contributions To Nephrology 1988:66:54-62)    Decreased erythropoietin concentrations with an elevated   hematocrit are observed in patients with polycythemia rubra vera,   and with a decreased hematocrit in patients with HIV infection who   are receiving AZT.  Patients on AZT who have anemia and   erythropoietin concentrations of less than or equal to 500 mU/mL   may benefit from therapy with recombinant EPO ([facility]   [id number]).  Performed By: [facility]  [address] [place] [address]  [id number]  : Mp Ruiz MD, PhD  IA Number: [id number]    LDH 07/25/2024 158  84 - 246 U/L Final    Vitamin B12 07/25/2024 556  211 - 911 pg/mL Final    Myeloid Malignancies Results 07/25/2024    Final                    Value:  DISEASE ASSOCIATED GENOMIC FINDINGS: Not Detected.      INTERPRETATION: No variants are detected in the listed gene regions.  This finding does not exclude the presence of genetic alterations present in gene regions not tested or occurring at a frequency below the limit of detection.    DISEASE RELEVANT ALTERATIONS NOT DETECTED:   Negative for JAK2 V617F.  Negative for JAK2 exon 12 mutation.  Negative for CALR mutation.  Negative for MPL mutation.    Amplicons with coverage <300x (Gene:Exon): (PHF6:7), (RUNX1:4), (SRSF2:1), (ZRSR2:10), (ZRSR2:2), (ZRSR2:4), (ZRSR2:6).  A false negative result cannot be excluded in these regions, especially in samples of low neoplastic cell content.    DISCLAIMER:   This assay is designed to detect targeted clinically-relevant single nucleotide variants and insertions and deletions (<30bp) in a select group of genes. This assay has also been designed to detect specific larger insertions and deletions: FLT3 internal tandem duplication (ITD) and CALR                           Type I mutations. This assay does not distinguish between somatic and germline alterations in analyzed regions. A negative result (mutation not identified) does not rule out the presence of a mutation below the limit of detection of this assay due to low neoplastic cell content, tumor heterogeneity, or the presence of additional mutations in the listed genes which are outside of the target regions in this assay. Mutations in some homopolymeric regions (eg. ASXL1 p.[id number]*12) are not consistently detected by this technology. General population polymorphisms, promoter, synonymous and intronic variants (with the exception of splice variants) are not generally  included in this report.    Identification or absence of cancer-associated mutations does not necessarily indicate a response to therapy. Decisions on patient care and treatment must be based on the independent medical judgment of the treating physician, taking into account all applicable information concerning the patient's                           condition such as clinical and histopathologic findings, other laboratory findings, and patient preferences. This report includes information from public sources, including scientific and medical literature to better characterize the significance of alterations detected.    This laboratory developed test was developed and its analytical performance characteristics have been determined by [facility]. This test has not been cleared or approved by the FDA; however, the FDA has determined that such approval is not necessary. The [facility] is certified under the Clinical Laboratory Improvement Amendments of 1988 (CLIA-88) as qualified to perform high complexity testing.    PANEL GENE LIST:  ASXL1(11-12), BRAF(15), CALR(9), CBL(7-9), CSF3R(14,17), DNMT3A(8-12,18-19,22-23), ETV6(2-8), EZH2(15-19), FLT3(14-16,20), GATA2(4-6), IDH1(4), IDH2(4), JAK2(12,14), JAK3(4,13,16), KIT(17), KRAS(2-4), MPL(4,10), MYD88(5), NPM1(11), NRAS(2-3), PHF6(2-10), PTPN11(3,13), RUNX1(4-9), SETBP1(4),                           SF3B1(14-16), SRSF2(1), TET2(3-11), TP53(2-11), U2AF1(2,6), WT1(7,9), ZRSR2(1-10).    Not all exons are sequenced in their entirety. Exons covered are shown in parenthesis. Genome assembly (hg19) was used for alignment and variant calling.        Electronically signed and reported* 07/25/2024    Final                    Value:Yocasta Sanchez MD PhD     Ig Churchill Free Light Chain 07/25/2024 2.40 (H)  0.33 - 1.94 mg/dL Final    Ig Lambda Free Light Chain 07/25/2024 1.46  0.57 - 2.63 mg/dL Final    Kappa/Lambda Ratio 07/25/2024 1.64  0.26 - 1.65 Final    IgG 07/25/2024  1,250  700 - 1,600 mg/dL Final    IgA 07/25/2024 199  70 - 400 mg/dL Final    IgM 07/25/2024 36 (L)  40 - 230 mg/dL Final    Hemochromatosis H63D Result 07/25/2024 Heterozygous (A)  Normal Final    Hemochromatosis C282Y Result 07/25/2024 Normal  Normal Final    Hemochromatosis Interpretation 07/25/2024    Final                    Value:INTERPRETATION  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 These results neither confirm nor rule out the diagnosis of hereditary hemochromatosis in this individual. Approximately 16% percent of healthy Caucasians of [place]  background are unaffected carriers of the HFE H63D mutation, and the relative risk for hemochromatosis among HFE H63D heterozygotes has not been demonstrated to differ from that of those with no mutation in HFE. For those with a personal history of definite or possible hereditary hemochromatosis, additional evaluations to identify other causes of disease should be considered for future medical management. Further, genetic testing is recommended for any first-degree relative of this individual who has been diagnosed with hereditary hemochromatosis, to determine the presence of an additional mutated HFE allele in the family.    METHODOLOGY  DNA                           was extracted from the specimen provided and analyzed using allele-specific TaqMan MGB probes following PCR.     DISCLAIMER  This laboratory developed test was developed and its analytical performance characteristics have been determined by [facility]. This test has not been cleared or approved by the FDA; however, the FDA has determined that such approval is not necessary. The [facility] is CAP accredited and certified under the Clinical Laboratory Improvement Amendments of 1988 (CLIA-88) as qualified to perform high complexity testing.      Electronically signed and  reported* 07/25/2024    Final                    Value:Matilde Ferreira MD PhD FACMG    NOVA 07/25/2024 Negative  Negative Final    The Antinuclear Antibody (NOVA) test was performed using  indirect immunofluorescence assay with HEp-2 cells slide.    Total Protein 07/25/2024 7.3  6.4 - 8.2 g/dL Final    Albumin 07/25/2024 4.2  3.4 - 5.0 g/dL Final    Alpha 1 Globulin 07/25/2024 0.3  0.2 - 0.6 g/dL Final    Alpha 2 Globulin 07/25/2024 0.8  0.4 - 1.1 g/dL Final    Beta Globulin 07/25/2024 0.8  0.5 - 1.2 g/dL Final    Gamma 07/25/2024 1.2  0.5 - 1.4 g/dL Final    Protein Electrophoresis Comment 07/25/2024 Normal.   Final    Immunofixation Comment 07/25/2024 No monoclonal protein detected by immunofixation.   Final    Path Review - Serum Protein Electr* 07/25/2024 Reviewed and approved by JOANNA DEAN on 7/29/24 at 8:59 PM.      Final    Path Review - Serum Immunofixation 07/25/2024 Reviewed and approved by JOANNA DEAN on 7/29/24 at 8:59 PM.      Final    Case Report 07/25/2024    Final                    Value:Flow Cytometry                                    Case: [id number]                                   Authorizing Provider:  Mely Blackburn PA-C          Collected:           07/25/2024 1203              Ordering Location:     [facility]  Received:            07/25/2024 1203                                     Center                                                                       Pathologist:           Kae Felipe MD                                                              Specimen:    Blood, Venous                                                                              Diagnosis 07/25/2024    Final                    Value:MORPHOLOGY:  WBC: Within normal limits.   RBC: Increased Hb. RBC with anisopoikilocytosis.   PLATELETS: Within normal limits.     FLOW CYTOMETRY:   1. Blood, Venous: No immunophenotypic evidence of a lymphoproliferative disorder. No increased or abnormal blast  population. No abnormality of granulocytes or monocytes.    Note: Clinical and morphologic correlation is suggested.     GENETICS:   Culture and hold.   JAK2 PCR.         07/25/2024    Final                    Value:By the signature on this report, the individual or group listed as making the Final Interpretation/Diagnosis certifies that they have reviewed this case and the staining reactivity of the antibodies and reagents in the analysis were determined to be acceptable. Diagnostic interpretation performed at [facility]      Flow Differential 07/25/2024    Final                    Value:Lymphocyte: 30 %       CD3+CD4+: 48 % ;            CD3+CD8+: 20 % ;            Natural Killer Cells: 15 %         CD19+: 17 %         B Cell Light Chain Expression: Polyclonal           Surface Kappa/Surface Lambda:           Monocyte: 8 %       Granulocyte: 54 %             Flow Test Ordered 07/25/2024 Acute Panel  not established Final    Specimen Viability 07/25/2024 Acceptable  not established Final    Cell Count 07/25/2024 11.30  not established x10*3/uL Final    Number of Cells Collected 07/25/2024 100,000.00  not established per tube Final    Methodology 07/25/2024    Final                    Value:Reference ranges not established.    This test is a multicolor, whole blood lysis assay. It was developed and its performance characteristics determined by the Department of Pathology, [facility], and has not been cleared or approved by the U.S. Food and Drug Administration. The laboratory is regulated under CLIA as qualified to perform high complexity testing. This test is used for clinical purposes. It should not be regarded as investigational or for research.    Immunophenotypic analysis was performed using the following antibodies: 1A: CD45, Non-FFPE Cytogenetics Culture and Hold, NON-FFPE JAK2 V617F PCR. 1B: CD71, CD1c, , CD34, CD14, CD45. 1C: CD20, CD19, CD10, CD34, CD38, CD45. 1D: CD15,  , CD33, CD34, HLA-DR, CD45. 1E: CD8, CD7, CD4, CD34, CD3, CD45. 1F: CD56, CD13, CD5, CD34, CD2, CD45. 1G: , CD13, , CD11b, CD16, CD45. 1H: Kappa Surface, Lambda Surface, CD9, CD22, CD19, CD45. 1I: TRBC1, TCR Gamma/Delta, CD4, CD8, CD3, CD45.          WBC 07/25/2024 11.3  4.4 - 11.3 x10*3/uL Final    RBC 07/25/2024 5.75  4.50 - 5.90 x10*6/uL Final    Hemoglobin 07/25/2024 17.8 (H)  13.5 - 17.5 g/dL Final    Hematocrit 07/25/2024 52.9 (H)  41.0 - 52.0 % Final    MCV 07/25/2024 92  80 - 100 fL Final    MCH 07/25/2024 31.0  26.0 - 34.0 pg Final    MCHC 07/25/2024 33.6  32.0 - 36.0 g/dL Final    RDW 07/25/2024 14.7 (H)  11.5 - 14.5 % Final    Platelets 07/25/2024 234  150 - 450 x10*3/uL Final    Neutrophils % 07/25/2024 54.2  40.0 - 80.0 % Final    Immature Granulocytes %, Automated 07/25/2024 0.9  0.0 - 0.9 % Final    Immature Granulocyte Count (IG) includes promyelocytes, myelocytes and metamyelocytes but does not include bands. Percent differential counts (%) should be interpreted in the context of the absolute cell counts (cells/UL).    Lymphocytes % 07/25/2024 31.5  13.0 - 44.0 % Final    Monocytes % 07/25/2024 10.3  2.0 - 10.0 % Final    Eosinophils % 07/25/2024 2.5  0.0 - 6.0 % Final    Basophils % 07/25/2024 0.6  0.0 - 2.0 % Final    Neutrophils Absolute 07/25/2024 6.15  1.20 - 7.70 x10*3/uL Final    Percent differential counts (%) should be interpreted in the context of the absolute cell counts (cells/uL).    Immature Granulocytes Absolute, Au* 07/25/2024 0.10  0.00 - 0.70 x10*3/uL Final    Lymphocytes Absolute 07/25/2024 3.57  1.20 - 4.80 x10*3/uL Final    Monocytes Absolute 07/25/2024 1.17 (H)  0.10 - 1.00 x10*3/uL Final    Eosinophils Absolute 07/25/2024 0.28  0.00 - 0.70 x10*3/uL Final    Basophils Absolute 07/25/2024 0.07  0.00 - 0.10 x10*3/uL Final    Cytogenetics Interpretation 07/25/2024    Final                    Value:Per Hematopathology Protocol, this 7/25/2024 Blood specimen has been  placed on culture and hold status.      Cultured cells will be kept until 2/2/2025.  Chromosome and FISH testing can be added to the specimen up to 180 days after receipt.  Please contact the Genetics lab at [phone] for details.      Electronically signed and reported* 07/25/2024    Final                    Value:Liza Mark Ph.D. FACMG, FCCMG     Lab on 05/06/2024   Component Date Value Ref Range Status    WBC 05/06/2024 11.8 (H)  4.4 - 11.3 x10*3/uL Final    nRBC 05/06/2024 0.0  0.0 - 0.0 /100 WBCs Final    RBC 05/06/2024 6.06 (H)  4.50 - 5.90 x10*6/uL Final    Hemoglobin 05/06/2024 18.3 (H)  13.5 - 17.5 g/dL Final    Hematocrit 05/06/2024 55.4 (H)  41.0 - 52.0 % Final    MCV 05/06/2024 91  80 - 100 fL Final    MCH 05/06/2024 30.2  26.0 - 34.0 pg Final    MCHC 05/06/2024 33.0  32.0 - 36.0 g/dL Final    RDW 05/06/2024 13.9  11.5 - 14.5 % Final    Platelets 05/06/2024 291  150 - 450 x10*3/uL Final   Lab on 04/04/2024   Component Date Value Ref Range Status    WBC 04/04/2024 11.2  4.4 - 11.3 x10*3/uL Final    nRBC 04/04/2024 0.0  0.0 - 0.0 /100 WBCs Final    RBC 04/04/2024 6.28 (H)  4.50 - 5.90 x10*6/uL Final    Hemoglobin 04/04/2024 19.8 (H)  13.5 - 17.5 g/dL Final    Hematocrit 04/04/2024 56.9 (H)  41.0 - 52.0 % Final    MCV 04/04/2024 91  80 - 100 fL Final    MCH 04/04/2024 31.5  26.0 - 34.0 pg Final    MCHC 04/04/2024 34.8  32.0 - 36.0 g/dL Final    RDW 04/04/2024 15.3 (H)  11.5 - 14.5 % Final    Platelets 04/04/2024 266  150 - 450 x10*3/uL Final    Glucose 04/04/2024 100 (H)  74 - 99 mg/dL Final    Sodium 04/04/2024 136  136 - 145 mmol/L Final    Potassium 04/04/2024 4.1  3.5 - 5.3 mmol/L Final    Chloride 04/04/2024 100  98 - 107 mmol/L Final    Bicarbonate 04/04/2024 28  21 - 32 mmol/L Final    Anion Gap 04/04/2024 12  10 - 20 mmol/L Final    Urea Nitrogen 04/04/2024 19  6 - 23 mg/dL Final    Creatinine 04/04/2024 1.28  0.50 - 1.30 mg/dL Final    eGFR 04/04/2024 62  >60 mL/min/1.73m*2 Final     Calculations of estimated GFR are performed using the 2021 CKD-EPI Study Refit equation without the race variable for the IDMS-Traceable creatinine methods.  https://jasn.asnjournals.org/content/early/2021/09/22/ASN.3679747371    Calcium 04/04/2024 9.5  8.6 - 10.3 mg/dL Final    Albumin 04/04/2024 4.4  3.4 - 5.0 g/dL Final    Alkaline Phosphatase 04/04/2024 79  33 - 136 U/L Final    Total Protein 04/04/2024 7.2  6.4 - 8.2 g/dL Final    AST 04/04/2024 16  9 - 39 U/L Final    Bilirubin, Total 04/04/2024 0.6  0.0 - 1.2 mg/dL Final    ALT 04/04/2024 24  10 - 52 U/L Final    Patients treated with Sulfasalazine may generate falsely decreased results for ALT.    Cholesterol 04/04/2024 191  0 - 199 mg/dL Final          Age      Desirable   Borderline High   High     0-19 Y     0 - 169       170 - 199     >/= 200    20-24 Y     0 - 189       190 - 224     >/= 225         >24 Y     0 - 199       200 - 239     >/= 240   **All ranges are based on fasting samples. Specific   therapeutic targets will vary based on patient-specific   cardiac risk.    Pediatric guidelines reference:Pediatrics 2011, 128(S5).Adult guidelines reference: NCEP ATPIII Guidelines,DINORA 2001, 258:2486-97    Venipuncture immediately after or during the administration of Metamizole may lead to falsely low results. Testing should be performed immediately prior to Metamizole dosing.    HDL-Cholesterol 04/04/2024 38.5  mg/dL Final      Age       Very Low   Low     Normal    High    0-19 Y    < 35      < 40     40-45     ----  20-24 Y    ----     < 40      >45      ----        >24 Y      ----     < 40     40-60      >60      Cholesterol/HDL Ratio 04/04/2024 5.0   Final      Ref Values  Desirable  < 3.4  High Risk  > 5.0    LDL Calculated 04/04/2024 123 (H)  <=99 mg/dL Final                                Near   Borderline      AGE      Desirable  Optimal    High     High     Very High     0-19 Y     0 - 109     ---    110-129   >/= 130     ----    20-24 Y     0 -  119     ---    120-159   >/= 160     ----      >24 Y     0 -  99   100-129  130-159   160-189     >/=190      VLDL 04/04/2024 30  0 - 40 mg/dL Final    Triglycerides 04/04/2024 149  0 - 149 mg/dL Final       Age         Desirable   Borderline High   High     Very High   0 D-90 D    19 - 174         ----         ----        ----  91 D- 9 Y     0 -  74        75 -  99     >/= 100      ----    10-19 Y     0 -  89        90 - 129     >/= 130      ----    20-24 Y     0 - 114       115 - 149     >/= 150      ----         >24 Y     0 - 149       150 - 199    200- 499    >/= 500    Venipuncture immediately after or during the administration of Metamizole may lead to falsely low results. Testing should be performed immediately prior to Metamizole dosing.    Non HDL Cholesterol 04/04/2024 153 (H)  0 - 149 mg/dL Final          Age       Desirable   Borderline High   High     Very High     0-19 Y     0 - 119       120 - 144     >/= 145    >/= 160    20-24 Y     0 - 149       150 - 189     >/= 190      ----         >24 Y    30 mg/dL above LDL Cholesterol goal      Thyroid Stimulating Hormone 04/04/2024 1.50  0.44 - 3.98 mIU/L Final    Color, Urine 04/04/2024 Yellow  Straw, Yellow Final    Appearance, Urine 04/04/2024 Clear  Clear Final    Specific Gravity, Urine 04/04/2024 1.019  1.005 - 1.035 Final    pH, Urine 04/04/2024 5.0  5.0, 5.5, 6.0, 6.5, 7.0, 7.5, 8.0 Final    Protein, Urine 04/04/2024 NEGATIVE  NEGATIVE mg/dL Final    Glucose, Urine 04/04/2024 NEGATIVE  NEGATIVE mg/dL Final    Blood, Urine 04/04/2024 NEGATIVE  NEGATIVE Final    Ketones, Urine 04/04/2024 NEGATIVE  NEGATIVE mg/dL Final    Bilirubin, Urine 04/04/2024 NEGATIVE  NEGATIVE Final    Urobilinogen, Urine 04/04/2024 <2.0  <2.0 mg/dL Final    Nitrite, Urine 04/04/2024 NEGATIVE  NEGATIVE Final    Leukocyte Esterase, Urine 04/04/2024 NEGATIVE  NEGATIVE Final     Assessment:    63 y.o. male with medical history s/f every day smoker referred for  polycythemia.    Plan:    Reviewed and discussed lab, imaging, and pathology results with patient in detail as well as diagnosis, prognosis, and treatment options.    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 Discussed having first degree relatives tested.    Discussed polycythemia 2/2 to smoking encourage smoking cessation.    F/U w/PCP    RTC in 6 months     Patient verbalized understanding, and all his questions were answered to his satisfaction    30 min spent with patient greater than 50 % of which was spent in consultation and coordination of care.

## 2024-09-05 ENCOUNTER — APPOINTMENT (OUTPATIENT)
Dept: PRIMARY CARE | Facility: CLINIC | Age: 64
End: 2024-09-05
Payer: COMMERCIAL

## 2024-09-05 VITALS — DIASTOLIC BLOOD PRESSURE: 68 MMHG | SYSTOLIC BLOOD PRESSURE: 122 MMHG

## 2024-09-05 DIAGNOSIS — F17.200 TOBACCO DEPENDENCE: ICD-10-CM

## 2024-09-05 DIAGNOSIS — E78.00 PURE HYPERCHOLESTEROLEMIA: ICD-10-CM

## 2024-09-05 DIAGNOSIS — Z12.11 COLON CANCER SCREENING: ICD-10-CM

## 2024-09-05 DIAGNOSIS — I10 PRIMARY HYPERTENSION: Primary | ICD-10-CM

## 2024-09-05 PROCEDURE — 3078F DIAST BP <80 MM HG: CPT | Performed by: FAMILY MEDICINE

## 2024-09-05 PROCEDURE — 3074F SYST BP LT 130 MM HG: CPT | Performed by: FAMILY MEDICINE

## 2024-09-05 PROCEDURE — 99214 OFFICE O/P EST MOD 30 MIN: CPT | Performed by: FAMILY MEDICINE

## 2024-09-05 RX ORDER — ROSUVASTATIN CALCIUM 40 MG/1
40 TABLET, COATED ORAL DAILY
Qty: 100 TABLET | Refills: 3 | Status: SHIPPED | OUTPATIENT
Start: 2024-09-05 | End: 2025-10-10

## 2024-09-05 RX ORDER — HYDROCHLOROTHIAZIDE 25 MG/1
25 TABLET ORAL DAILY
Qty: 90 TABLET | Refills: 1 | Status: SHIPPED | OUTPATIENT
Start: 2024-09-05

## 2024-09-05 RX ORDER — POTASSIUM CHLORIDE 20 MEQ/1
20 TABLET, EXTENDED RELEASE ORAL DAILY
Qty: 90 TABLET | Refills: 1 | Status: SHIPPED | OUTPATIENT
Start: 2024-09-05

## 2024-09-05 NOTE — ASSESSMENT & PLAN NOTE
Hyperlipidemia on statin. No GI upset or muscle ache. Will monitor labs for evaluation.  Health diet and regular exercise. Decrease calorie intake to lose wt.  f/u in 6 mos. Orders:    rosuvastatin (Crestor) 40 mg tablet; Take 1 tablet (40 mg) by mouth once daily.

## 2024-09-05 NOTE — ASSESSMENT & PLAN NOTE
BP has been controlled. Continue BP pills. keep a daily  bp log and bring in the log at the next office visit. Call office if BP is persistently over 130/80. DASH diet and regular exercise. Decrease calorie intake to lose wt.      Orders:    hydroCHLOROthiazide (HYDRODiuril) 25 mg tablet; Take 1 tablet (25 mg) by mouth once daily.    potassium chloride CR 20 mEq ER tablet; Take 1 tablet (20 mEq) by mouth once daily.

## 2024-09-05 NOTE — PROGRESS NOTES
Subjective   Patient ID: Jeb Lobo is a 63 y.o. male who presents for fu    HPI   Patient has been compliant with taking all  current medications. No GI upset  or muscle ache while on statin for high lipids. Normal appetite. No sob, cough, CP, HA, dizziness, heart palpitation. No claudication or cold LE.  No LE edema.  Good mood.     Review of Systems    Objective   /68     Physical Exam  NAD, well groomed, No sclera icterus. neck: supple, no cervical lymphadenopathy,  lungs: CTA b/l, heart: RRR, No LE edema, normal pedal pulses, abd: soft, no tenderness, BS+,   Good balance. CNII-XII were grossly intact, good  mood.    Assessment/Plan   Assessment & Plan  Colon cancer screening    Orders:    Fecal Occult Blood Immunoassay; Future    Tobacco dependence  Dc smoking. Pt is trying to quit  Orders:    CT lung screening low dose; Future    Primary hypertension  BP has been controlled. Continue BP pills. keep a daily  bp log and bring in the log at the next office visit. Call office if BP is persistently over 130/80. DASH diet and regular exercise. Decrease calorie intake to lose wt.      Orders:    hydroCHLOROthiazide (HYDRODiuril) 25 mg tablet; Take 1 tablet (25 mg) by mouth once daily.    potassium chloride CR 20 mEq ER tablet; Take 1 tablet (20 mEq) by mouth once daily.    Pure hypercholesterolemia  Hyperlipidemia on statin. No GI upset or muscle ache. Will monitor labs for evaluation.  Health diet and regular exercise. Decrease calorie intake to lose wt.  f/u in 6 mos. Orders:    rosuvastatin (Crestor) 40 mg tablet; Take 1 tablet (40 mg) by mouth once daily.

## 2024-09-11 ENCOUNTER — APPOINTMENT (OUTPATIENT)
Dept: PRIMARY CARE | Facility: CLINIC | Age: 64
End: 2024-09-11
Payer: COMMERCIAL

## 2024-09-12 ENCOUNTER — HOSPITAL ENCOUNTER (EMERGENCY)
Facility: HOSPITAL | Age: 64
Discharge: HOME | End: 2024-09-12
Payer: COMMERCIAL

## 2024-09-12 ENCOUNTER — APPOINTMENT (OUTPATIENT)
Dept: CARDIOLOGY | Facility: HOSPITAL | Age: 64
End: 2024-09-12
Payer: COMMERCIAL

## 2024-09-12 ENCOUNTER — APPOINTMENT (OUTPATIENT)
Dept: RADIOLOGY | Facility: HOSPITAL | Age: 64
End: 2024-09-12
Payer: COMMERCIAL

## 2024-09-12 VITALS
HEART RATE: 82 BPM | DIASTOLIC BLOOD PRESSURE: 97 MMHG | TEMPERATURE: 98.6 F | OXYGEN SATURATION: 95 % | RESPIRATION RATE: 20 BRPM | SYSTOLIC BLOOD PRESSURE: 158 MMHG | BODY MASS INDEX: 35.84 KG/M2 | HEIGHT: 69 IN | WEIGHT: 242 LBS

## 2024-09-12 DIAGNOSIS — R05.8 POST-TUSSIVE SYNCOPE: ICD-10-CM

## 2024-09-12 DIAGNOSIS — J06.9 UPPER RESPIRATORY TRACT INFECTION, UNSPECIFIED TYPE: Primary | ICD-10-CM

## 2024-09-12 LAB
ALBUMIN SERPL BCP-MCNC: 4.3 G/DL (ref 3.4–5)
ALP SERPL-CCNC: 70 U/L (ref 33–136)
ALT SERPL W P-5'-P-CCNC: 30 U/L (ref 10–52)
ANION GAP SERPL CALC-SCNC: 13 MMOL/L (ref 10–20)
AST SERPL W P-5'-P-CCNC: 23 U/L (ref 9–39)
BASOPHILS # BLD AUTO: 0.09 X10*3/UL (ref 0–0.1)
BASOPHILS NFR BLD AUTO: 1 %
BILIRUB SERPL-MCNC: 0.5 MG/DL (ref 0–1.2)
BUN SERPL-MCNC: 18 MG/DL (ref 6–23)
CALCIUM SERPL-MCNC: 9 MG/DL (ref 8.6–10.3)
CARDIAC TROPONIN I PNL SERPL HS: 10 NG/L (ref 0–20)
CARDIAC TROPONIN I PNL SERPL HS: 10 NG/L (ref 0–20)
CHLORIDE SERPL-SCNC: 102 MMOL/L (ref 98–107)
CO2 SERPL-SCNC: 24 MMOL/L (ref 21–32)
CREAT SERPL-MCNC: 1.13 MG/DL (ref 0.5–1.3)
D DIMER PPP FEU-MCNC: 375 NG/ML FEU
EGFRCR SERPLBLD CKD-EPI 2021: 73 ML/MIN/1.73M*2
EOSINOPHIL # BLD AUTO: 0.4 X10*3/UL (ref 0–0.7)
EOSINOPHIL NFR BLD AUTO: 4.6 %
ERYTHROCYTE [DISTWIDTH] IN BLOOD BY AUTOMATED COUNT: 14.9 % (ref 11.5–14.5)
FLUAV RNA RESP QL NAA+PROBE: NOT DETECTED
FLUBV RNA RESP QL NAA+PROBE: NOT DETECTED
GLUCOSE SERPL-MCNC: 96 MG/DL (ref 74–99)
HCT VFR BLD AUTO: 53.3 % (ref 41–52)
HGB BLD-MCNC: 18.6 G/DL (ref 13.5–17.5)
IMM GRANULOCYTES # BLD AUTO: 0.06 X10*3/UL (ref 0–0.7)
IMM GRANULOCYTES NFR BLD AUTO: 0.7 % (ref 0–0.9)
LYMPHOCYTES # BLD AUTO: 2.55 X10*3/UL (ref 1.2–4.8)
LYMPHOCYTES NFR BLD AUTO: 29.1 %
MCH RBC QN AUTO: 30.8 PG (ref 26–34)
MCHC RBC AUTO-ENTMCNC: 34.9 G/DL (ref 32–36)
MCV RBC AUTO: 88 FL (ref 80–100)
MONOCYTES # BLD AUTO: 1.34 X10*3/UL (ref 0.1–1)
MONOCYTES NFR BLD AUTO: 15.3 %
NEUTROPHILS # BLD AUTO: 4.32 X10*3/UL (ref 1.2–7.7)
NEUTROPHILS NFR BLD AUTO: 49.3 %
NRBC BLD-RTO: 0 /100 WBCS (ref 0–0)
PLATELET # BLD AUTO: 223 X10*3/UL (ref 150–450)
POTASSIUM SERPL-SCNC: 3.5 MMOL/L (ref 3.5–5.3)
PROT SERPL-MCNC: 7.6 G/DL (ref 6.4–8.2)
RBC # BLD AUTO: 6.03 X10*6/UL (ref 4.5–5.9)
RSV RNA RESP QL NAA+PROBE: NOT DETECTED
SARS-COV-2 RNA RESP QL NAA+PROBE: NOT DETECTED
SODIUM SERPL-SCNC: 135 MMOL/L (ref 136–145)
WBC # BLD AUTO: 8.8 X10*3/UL (ref 4.4–11.3)

## 2024-09-12 PROCEDURE — 71046 X-RAY EXAM CHEST 2 VIEWS: CPT | Performed by: RADIOLOGY

## 2024-09-12 PROCEDURE — 87637 SARSCOV2&INF A&B&RSV AMP PRB: CPT | Performed by: NURSE PRACTITIONER

## 2024-09-12 PROCEDURE — 84484 ASSAY OF TROPONIN QUANT: CPT | Performed by: NURSE PRACTITIONER

## 2024-09-12 PROCEDURE — 2500000004 HC RX 250 GENERAL PHARMACY W/ HCPCS (ALT 636 FOR OP/ED): Performed by: NURSE PRACTITIONER

## 2024-09-12 PROCEDURE — 2500000002 HC RX 250 W HCPCS SELF ADMINISTERED DRUGS (ALT 637 FOR MEDICARE OP, ALT 636 FOR OP/ED): Performed by: NURSE PRACTITIONER

## 2024-09-12 PROCEDURE — 93005 ELECTROCARDIOGRAM TRACING: CPT

## 2024-09-12 PROCEDURE — 85025 COMPLETE CBC W/AUTO DIFF WBC: CPT | Performed by: NURSE PRACTITIONER

## 2024-09-12 PROCEDURE — 36415 COLL VENOUS BLD VENIPUNCTURE: CPT | Performed by: NURSE PRACTITIONER

## 2024-09-12 PROCEDURE — 80053 COMPREHEN METABOLIC PANEL: CPT | Performed by: NURSE PRACTITIONER

## 2024-09-12 PROCEDURE — 94640 AIRWAY INHALATION TREATMENT: CPT

## 2024-09-12 PROCEDURE — 99283 EMERGENCY DEPT VISIT LOW MDM: CPT | Mod: 25

## 2024-09-12 PROCEDURE — 85379 FIBRIN DEGRADATION QUANT: CPT | Performed by: NURSE PRACTITIONER

## 2024-09-12 PROCEDURE — 71046 X-RAY EXAM CHEST 2 VIEWS: CPT

## 2024-09-12 PROCEDURE — 2500000001 HC RX 250 WO HCPCS SELF ADMINISTERED DRUGS (ALT 637 FOR MEDICARE OP): Performed by: NURSE PRACTITIONER

## 2024-09-12 RX ORDER — PREDNISONE 50 MG/1
50 TABLET ORAL DAILY
Qty: 5 TABLET | Refills: 0 | Status: SHIPPED | OUTPATIENT
Start: 2024-09-12 | End: 2024-09-17

## 2024-09-12 RX ORDER — IPRATROPIUM BROMIDE AND ALBUTEROL SULFATE 2.5; .5 MG/3ML; MG/3ML
3 SOLUTION RESPIRATORY (INHALATION)
Status: DISCONTINUED | OUTPATIENT
Start: 2024-09-12 | End: 2024-09-12 | Stop reason: HOSPADM

## 2024-09-12 RX ORDER — DOXYCYCLINE 100 MG/1
100 CAPSULE ORAL ONCE
Status: COMPLETED | OUTPATIENT
Start: 2024-09-12 | End: 2024-09-12

## 2024-09-12 RX ORDER — ALBUTEROL SULFATE 0.83 MG/ML
2.5 SOLUTION RESPIRATORY (INHALATION) ONCE
Status: COMPLETED | OUTPATIENT
Start: 2024-09-12 | End: 2024-09-12

## 2024-09-12 RX ORDER — DOXYCYCLINE 100 MG/1
100 TABLET ORAL 2 TIMES DAILY
Qty: 14 TABLET | Refills: 0 | Status: SHIPPED | OUTPATIENT
Start: 2024-09-12 | End: 2024-09-19

## 2024-09-12 RX ORDER — ALBUTEROL SULFATE 90 UG/1
2 INHALANT RESPIRATORY (INHALATION) EVERY 4 HOURS PRN
Qty: 18 G | Refills: 0 | Status: SHIPPED | OUTPATIENT
Start: 2024-09-12 | End: 2024-10-12

## 2024-09-12 RX ADMIN — IPRATROPIUM BROMIDE AND ALBUTEROL SULFATE 3 ML: 2.5; .5 SOLUTION RESPIRATORY (INHALATION) at 16:00

## 2024-09-12 RX ADMIN — DOXYCYCLINE 100 MG: 100 CAPSULE ORAL at 17:05

## 2024-09-12 RX ADMIN — METHYLPREDNISOLONE SODIUM SUCCINATE 125 MG: 125 INJECTION, POWDER, FOR SOLUTION INTRAMUSCULAR; INTRAVENOUS at 16:03

## 2024-09-12 RX ADMIN — ALBUTEROL SULFATE 2.5 MG: 2.5 SOLUTION RESPIRATORY (INHALATION) at 17:07

## 2024-09-12 ASSESSMENT — LIFESTYLE VARIABLES
HAVE PEOPLE ANNOYED YOU BY CRITICIZING YOUR DRINKING: NO
HAVE YOU EVER FELT YOU SHOULD CUT DOWN ON YOUR DRINKING: NO
EVER HAD A DRINK FIRST THING IN THE MORNING TO STEADY YOUR NERVES TO GET RID OF A HANGOVER: NO
TOTAL SCORE: 0
EVER FELT BAD OR GUILTY ABOUT YOUR DRINKING: NO

## 2024-09-12 ASSESSMENT — PAIN DESCRIPTION - LOCATION: LOCATION: NECK

## 2024-09-12 ASSESSMENT — COLUMBIA-SUICIDE SEVERITY RATING SCALE - C-SSRS
1. IN THE PAST MONTH, HAVE YOU WISHED YOU WERE DEAD OR WISHED YOU COULD GO TO SLEEP AND NOT WAKE UP?: NO
6. HAVE YOU EVER DONE ANYTHING, STARTED TO DO ANYTHING, OR PREPARED TO DO ANYTHING TO END YOUR LIFE?: NO
2. HAVE YOU ACTUALLY HAD ANY THOUGHTS OF KILLING YOURSELF?: NO

## 2024-09-12 ASSESSMENT — PAIN - FUNCTIONAL ASSESSMENT: PAIN_FUNCTIONAL_ASSESSMENT: 0-10

## 2024-09-12 ASSESSMENT — PAIN SCALES - GENERAL: PAINLEVEL_OUTOF10: 2

## 2024-09-12 NOTE — ED PROVIDER NOTES
Emergency Department Encounter  [facility] EMERGENCY MEDICINE    Patient: Jeb Lobo  MRN: [id number]  : 1960  Date of Evaluation: 2024  ED Provider: KELLY Loving    ED care was supervised by Dr. Hurd who independently examined and evaluated the patient. Please see their attestation note for further details.    Limitations to history: none  Independent Historian: self  Records reviewed: Care everywhere, paper chart, Inpatient and outpatient notes    Chief Complaint       Chief Complaint   Patient presents with    Syncope     Cough x 3 days states after cough feels dizzy then if he stands with cough he passes out. 2 episodes.   States has had a fever.   Took covid test that was neg.     Sun'aq    (Location/Symptom, Timing/Onset, Context/Setting, Quality, Duration, Modifying Factors, Severity) Note limiting factors.   Jeb Lobo is a 63 y.o. male who presents to the emergency department complaining of 2 syncopal events after coughing hard, endorses upper respiratory symptoms that started 5 to 6 days ago.  Reports chest congestion, cough with clear sputum.  States that he coughs so hard that the last 2 to episodes of coughing caused him to pass out.  Denies any head injury.  States that he did lose consciousness for approximately 1 minute.  No loss of bowel or bladder.  Denies any chest pain, palpitations, abdominal pain.  Does report tactile fevers at home over the last few days, has a history of COPD, is a smoker.  States that he tried to alleviate his upper respiratory symptoms with an herbal supplement of Tee which comes in liquid form as well as a smokable form which patient attempted to smoke 4 days ago.  Second episode of syncope due to cough was approximately 45 minutes prior to arrival.  Denies any hemoptysis.  Denies any history of PE or DVT, does endorse right lower extremity intermittent pedal edema over the last year but states that it has improved without any  acute symptoms recently.    ROS:     Review of Systems  14 systems reviewed and otherwise acutely negative except as in the [place].          Past History     Past Medical History:   Diagnosis Date    Cutaneous abscess of buttock 11/07/2016    Abscess of buttock, left    Essential (primary) hypertension 03/11/2016    HTN (hypertension), benign    Localized swelling, mass and lump, unspecified 01/23/2017    Lump of skin    Personal history of other diseases of the circulatory system 05/18/2015    History of intermittent claudication    Personal history of other diseases of the musculoskeletal system and connective tissue 03/11/2016    History of low back pain    Personal history of other specified conditions 08/24/2015    History of urinary frequency    Secondary polycythemia 12/11/2015    Secondary polycythemia    Unspecified asthma, uncomplicated ([facility]) 08/22/2017    Mild reactive airways disease     Past Surgical History:   Procedure Laterality Date    TONSILLECTOMY  05/18/2015    Tonsillectomy With Adenoidectomy     Social History     Socioeconomic History    Marital status:    Tobacco Use    Smoking status: Every Day     Current packs/day: 1.00     Average packs/day: 1 pack/day for 50.0 years (50.0 ttl pk-yrs)     Types: Cigarettes    Smokeless tobacco: Never   Substance and Sexual Activity    Alcohol use: Not Currently    Drug use: Not Currently       Medications/Allergies     Previous Medications    HYDROCHLOROTHIAZIDE (HYDRODIURIL) 25 MG TABLET    Take 1 tablet (25 mg) by mouth once daily.    NICOTINE (NICODERM CQ) 14 MG/24 HR PATCH    Place 1 patch over 24 hours on the skin once every 24 hours.    POTASSIUM CHLORIDE CR 20 MEQ ER TABLET    Take 1 tablet (20 mEq) by mouth once daily.    ROSUVASTATIN (CRESTOR) 40 MG TABLET    Take 1 tablet (40 mg) by mouth once daily.     No Known Allergies     Physical Exam       ED Triage Vitals [09/12/24 1511]   Temperature Heart Rate Respirations BP   37 °C (98.6 °F)  82 20 (!) 158/97      Pulse Ox Temp src Heart Rate Source Patient Position   95 % -- Monitor --      BP Location FiO2 (%)     -- --         Physical Exam    GENERAL:  The patient appears nourished and normally developed. Vital signs as documented.     HEENT:  Head normocephalic, atraumatic, EOMs intact, PERRLA, Mucous membranes moist. Nares patent without copious rhinorrhea.  No lymphadenopathy.    PULMONARY:  Lungs with coarse lung sounds, wheezing, rhonchi diffusely, without any respiratory distress. Able to speak full sentences, no accessory muscle use    CARDIAC:   Normal rate. No murmurs, rubs or gallops    ABDOMEN:  Soft, non-distended, non-tender, BS positive x 4 quadrants, No rebound or guarding, no peritoneal signs, no CVA tenderness, no masses or organomegaly    MUSCULOSKELETAL:   Able to ambulate, Non edematous, with no obvious deformities. Pulses intact distal    SKIN:   Good color, with no significant rashes.  No pallor.    NEURO:  No obvious neurological deficits, normal sensation and strength bilaterally.  Able to follow commands, NIH 0, CN 2-12 intact.        Diagnostics   Labs:  Results for orders placed or performed during the hospital encounter of 09/12/24   CBC and Auto Differential   Result Value Ref Range    WBC 8.8 4.4 - 11.3 x10*3/uL    nRBC 0.0 0.0 - 0.0 /100 WBCs    RBC 6.03 (H) 4.50 - 5.90 x10*6/uL    Hemoglobin 18.6 (H) 13.5 - 17.5 g/dL    Hematocrit 53.3 (H) 41.0 - 52.0 %    MCV 88 80 - 100 fL    MCH 30.8 26.0 - 34.0 pg    MCHC 34.9 32.0 - 36.0 g/dL    RDW 14.9 (H) 11.5 - 14.5 %    Platelets 223 150 - 450 x10*3/uL    Neutrophils % 49.3 40.0 - 80.0 %    Immature Granulocytes %, Automated 0.7 0.0 - 0.9 %    Lymphocytes % 29.1 13.0 - 44.0 %    Monocytes % 15.3 2.0 - 10.0 %    Eosinophils % 4.6 0.0 - 6.0 %    Basophils % 1.0 0.0 - 2.0 %    Neutrophils Absolute 4.32 1.20 - 7.70 x10*3/uL    Immature Granulocytes Absolute, Automated 0.06 0.00 - 0.70 x10*3/uL    Lymphocytes Absolute 2.55 1.20 -  4.80 x10*3/uL    Monocytes Absolute 1.34 (H) 0.10 - 1.00 x10*3/uL    Eosinophils Absolute 0.40 0.00 - 0.70 x10*3/uL    Basophils Absolute 0.09 0.00 - 0.10 x10*3/uL   Comprehensive metabolic panel   Result Value Ref Range    Glucose 96 74 - 99 mg/dL    Sodium 135 (L) 136 - 145 mmol/L    Potassium 3.5 3.5 - 5.3 mmol/L    Chloride 102 98 - 107 mmol/L    Bicarbonate 24 21 - 32 mmol/L    Anion Gap 13 10 - 20 mmol/L    Urea Nitrogen 18 6 - 23 mg/dL    Creatinine 1.13 0.50 - 1.30 mg/dL    eGFR 73 >60 mL/min/1.73m*2    Calcium 9.0 8.6 - 10.3 mg/dL    Albumin 4.3 3.4 - 5.0 g/dL    Alkaline Phosphatase 70 33 - 136 U/L    Total Protein 7.6 6.4 - 8.2 g/dL    AST 23 9 - 39 U/L    Bilirubin, Total 0.5 0.0 - 1.2 mg/dL    ALT 30 10 - 52 U/L   Influenza A, and B PCR   Result Value Ref Range    Flu A Result Not Detected Not Detected    Flu B Result Not Detected Not Detected   Sars-CoV-2 PCR   Result Value Ref Range    Coronavirus 2019, PCR Not Detected Not Detected   RSV PCR   Result Value Ref Range    RSV PCR Not Detected Not Detected   D-Dimer, VTE Exclusion   Result Value Ref Range    D-Dimer, Quantitative VTE Exclusion 375 <=500 ng/mL FEU   Troponin I, High Sensitivity, Initial   Result Value Ref Range    Troponin I, High Sensitivity 10 0 - 20 ng/L     Radiographs:  XR chest 2 views   Final Result   1.  No evidence of acute cardiopulmonary process.                  MACRO:   None        Signed by: Serina Garcia 9/12/2024 4:38 PM   Dictation workstation:   [id number]          Procedures:   Procedures     EKG: Independently reviewed by this provider at 1558  Indication: Syncope  Rate: 78  Rhythm: Sinus rhythm  Interval: Right bundle branch block, OK interval 152, , , QTc 461  Axis: Rightward axis   ST Segment: No ST elevation        Assessment   In brief, Jeb Lobo is a 63 y.o. male who presented to the emergency department with posttussive syncope, cough, chest congestion    Plan   IV, lab work, EKG,  "imaging    Differentials   Pneumonia  Viral illness  ACS  PE  Arrhythmia  Seizure    ED Course     Diagnoses as of 09/12/24 1702   Upper respiratory tract infection, unspecified type   Post-tussive syncope       Visit Vitals  BP (!) 158/97   Pulse 82   Temp 37 °C (98.6 °F)   Resp 20   Ht 1.753 m (5' 9\")   Wt 110 kg (242 lb)   SpO2 95%   BMI 35.74 kg/m²   Smoking Status Every Day   BSA 2.31 m²       Medications   ipratropium-albuteroL (Duo-Neb) 0.5-2.5 mg/3 mL nebulizer solution 3 mL (3 mL nebulization Given 9/12/24 1600)   albuterol 2.5 mg /3 mL (0.083 %) nebulizer solution 2.5 mg (has no administration in time range)   doxycycline (Vibramycin) capsule 100 mg (has no administration in time range)   methylPREDNISolone sod succinate (SOLU-Medrol) injection 125 mg (125 mg intravenous Given 9/12/24 1603)       Plan of care discussed, patient is stable appearing, in no distress, is not hypoxic or tachycardic.  Patient states that his 2 syncopal episodes were associated with coughing so hard that he felt like he could have a bowel movement, states that he felt like he had to bring up a lot of phlegm so he stood up very fast when he was coughing hard and passed out.  Witnessed by his  who is at the bedside, states that he passed out for a second and was awake by the time he hit the ground, no obvious head injury, patient states that he has no headache, has mild left neck soreness, no midline spinal tenderness.  Does not take any anticoagulants.  Reports improvement of cough with aerosol treatment.  Was given Solu-Medrol, DuoNeb, albuterol.  Offered patient admission for observation for syncope which patient declined, was given strict return precautions, will be treated for upper respiratory infection and given a prescription for prednisone, doxycycline, albuterol inhaler.  Patient does report that he was never formally diagnosed with COPD, encouraged to follow-up with pulmonology or his primary care doctor.  " Patient is agreeable to the plan and is stable for discharge home      Final Impression      1. Upper respiratory tract infection, unspecified type    2. Post-tussive syncope          DISPOSITION  Disposition: Discharge to home  Patient condition is stable    Comment: Please note this report has been produced using speech recognition software and may contain errors related to that system including errors in grammar, punctuation, and spelling, as well as words and phrases that may be inappropriate.  If there are any questions or concerns please feel free to contact the dictating provider for clarification.    KELLY Loving APRN-CNP  09/12/24 4161

## 2024-09-15 LAB
ATRIAL RATE: 79 BPM
P AXIS: 24 DEGREES
PR INTERVAL: 152 MS
Q ONSET: 249 MS
QRS COUNT: 12 BEATS
QRS DURATION: 155 MS
QT INTERVAL: 404 MS
QTC CALCULATION(BAZETT): 461 MS
QTC FREDERICIA: 441 MS
R AXIS: -35 DEGREES
T AXIS: 2 DEGREES
T OFFSET: 451 MS
VENTRICULAR RATE: 78 BPM

## 2024-09-16 ENCOUNTER — TELEPHONE (OUTPATIENT)
Dept: PRIMARY CARE | Facility: CLINIC | Age: 64
End: 2024-09-16
Payer: COMMERCIAL

## 2024-09-16 NOTE — TELEPHONE ENCOUNTER
Pt has upper respiratory infection and was rX'ed antibiotic that is not working. Pt would like to know if you will call in z pack?

## 2024-09-17 ENCOUNTER — APPOINTMENT (OUTPATIENT)
Dept: PRIMARY CARE | Facility: CLINIC | Age: 64
End: 2024-09-17
Payer: COMMERCIAL

## 2024-10-30 ENCOUNTER — TELEPHONE (OUTPATIENT)
Dept: PRIMARY CARE | Facility: CLINIC | Age: 64
End: 2024-10-30
Payer: COMMERCIAL

## 2025-03-06 ENCOUNTER — APPOINTMENT (OUTPATIENT)
Dept: PRIMARY CARE | Facility: CLINIC | Age: 65
End: 2025-03-06
Payer: COMMERCIAL

## 2025-03-25 ENCOUNTER — OFFICE VISIT (OUTPATIENT)
Dept: HEMATOLOGY/ONCOLOGY | Facility: CLINIC | Age: 65
End: 2025-03-25
Payer: MEDICARE

## 2025-03-25 VITALS
SYSTOLIC BLOOD PRESSURE: 145 MMHG | TEMPERATURE: 98.2 F | OXYGEN SATURATION: 96 % | RESPIRATION RATE: 17 BRPM | WEIGHT: 251.32 LBS | HEART RATE: 92 BPM | DIASTOLIC BLOOD PRESSURE: 99 MMHG | BODY MASS INDEX: 37.11 KG/M2

## 2025-03-25 DIAGNOSIS — Z71.89 HISTORY OF PARTICIPATION IN SMOKING CESSATION COUNSELING: Primary | ICD-10-CM

## 2025-03-25 DIAGNOSIS — D75.1 ERYTHROCYTOSIS: ICD-10-CM

## 2025-03-25 LAB
BASOPHILS # BLD AUTO: 0.1 X10*3/UL (ref 0–0.1)
BASOPHILS NFR BLD AUTO: 0.7 %
EOSINOPHIL # BLD AUTO: 0.41 X10*3/UL (ref 0–0.7)
EOSINOPHIL NFR BLD AUTO: 3 %
ERYTHROCYTE [DISTWIDTH] IN BLOOD BY AUTOMATED COUNT: 14.4 % (ref 11.5–14.5)
FERRITIN SERPL-MCNC: 342 NG/ML (ref 20–300)
HCT VFR BLD AUTO: 53.8 % (ref 41–52)
HGB BLD-MCNC: 18.3 G/DL (ref 13.5–17.5)
IMM GRANULOCYTES # BLD AUTO: 0.13 X10*3/UL (ref 0–0.7)
IMM GRANULOCYTES NFR BLD AUTO: 1 % (ref 0–0.9)
IRON SATN MFR SERPL: 26 % (ref 25–45)
IRON SERPL-MCNC: 75 UG/DL (ref 35–150)
LYMPHOCYTES # BLD AUTO: 4.81 X10*3/UL (ref 1.2–4.8)
LYMPHOCYTES NFR BLD AUTO: 35.7 %
MCH RBC QN AUTO: 31.2 PG (ref 26–34)
MCHC RBC AUTO-ENTMCNC: 34 G/DL (ref 32–36)
MCV RBC AUTO: 92 FL (ref 80–100)
MONOCYTES # BLD AUTO: 1.3 X10*3/UL (ref 0.1–1)
MONOCYTES NFR BLD AUTO: 9.7 %
NEUTROPHILS # BLD AUTO: 6.72 X10*3/UL (ref 1.2–7.7)
NEUTROPHILS NFR BLD AUTO: 49.9 %
PLATELET # BLD AUTO: 266 X10*3/UL (ref 150–450)
RBC # BLD AUTO: 5.87 X10*6/UL (ref 4.5–5.9)
TIBC SERPL-MCNC: 294 UG/DL (ref 240–445)
UIBC SERPL-MCNC: 219 UG/DL (ref 110–370)
WBC # BLD AUTO: 13.5 X10*3/UL (ref 4.4–11.3)

## 2025-03-25 PROCEDURE — 99214 OFFICE O/P EST MOD 30 MIN: CPT | Performed by: PHYSICIAN ASSISTANT

## 2025-03-25 PROCEDURE — 1126F AMNT PAIN NOTED NONE PRSNT: CPT | Performed by: PHYSICIAN ASSISTANT

## 2025-03-25 PROCEDURE — 3080F DIAST BP >= 90 MM HG: CPT | Performed by: PHYSICIAN ASSISTANT

## 2025-03-25 PROCEDURE — 36415 COLL VENOUS BLD VENIPUNCTURE: CPT | Performed by: PHYSICIAN ASSISTANT

## 2025-03-25 PROCEDURE — 3077F SYST BP >= 140 MM HG: CPT | Performed by: PHYSICIAN ASSISTANT

## 2025-03-25 PROCEDURE — 1159F MED LIST DOCD IN RCRD: CPT | Performed by: PHYSICIAN ASSISTANT

## 2025-03-25 PROCEDURE — 85025 COMPLETE CBC W/AUTO DIFF WBC: CPT | Performed by: PHYSICIAN ASSISTANT

## 2025-03-25 PROCEDURE — 83550 IRON BINDING TEST: CPT | Performed by: PHYSICIAN ASSISTANT

## 2025-03-25 PROCEDURE — 82728 ASSAY OF FERRITIN: CPT | Performed by: PHYSICIAN ASSISTANT

## 2025-03-25 RX ORDER — BUPROPION HYDROCHLORIDE 150 MG/1
150 TABLET, EXTENDED RELEASE ORAL 2 TIMES DAILY
Qty: 60 TABLET | Refills: 11 | Status: SHIPPED | OUTPATIENT
Start: 2025-03-25 | End: 2026-03-25

## 2025-03-25 RX ORDER — CEPHALEXIN 500 MG/1
500 CAPSULE ORAL 2 TIMES DAILY
Qty: 20 CAPSULE | Refills: 0 | Status: SHIPPED | OUTPATIENT
Start: 2025-03-25 | End: 2025-04-04

## 2025-03-25 ASSESSMENT — PATIENT HEALTH QUESTIONNAIRE - PHQ9
1. LITTLE INTEREST OR PLEASURE IN DOING THINGS: NOT AT ALL
2. FEELING DOWN, DEPRESSED OR HOPELESS: NOT AT ALL
SUM OF ALL RESPONSES TO PHQ9 QUESTIONS 1 AND 2: 0

## 2025-03-25 ASSESSMENT — COLUMBIA-SUICIDE SEVERITY RATING SCALE - C-SSRS
2. HAVE YOU ACTUALLY HAD ANY THOUGHTS OF KILLING YOURSELF?: NO
1. IN THE PAST MONTH, HAVE YOU WISHED YOU WERE DEAD OR WISHED YOU COULD GO TO SLEEP AND NOT WAKE UP?: NO
6. HAVE YOU EVER DONE ANYTHING, STARTED TO DO ANYTHING, OR PREPARED TO DO ANYTHING TO END YOUR LIFE?: NO

## 2025-03-25 ASSESSMENT — PAIN SCALES - GENERAL: PAINLEVEL_OUTOF10: 0-NO PAIN

## 2025-03-25 NOTE — PROGRESS NOTES
Reason for Visit  Jeb Lobo is a 64 y.o. male with medical history s/f every day smoker referred by Dr. Flynn for polycythemia.    Upon review of labs, noted to have polycythemia since 1/2023, with intermittent leucocytosis with no lymphocytosis, normal platelet count,    On assessment, reports only recently established care with PCP and prior to last year didn't see provider. He is a long time smoker and recently tried to quite using Nicotine gum. He reports overall feeling well and has no complaints. Denies B symptoms, bleeding from any source, pruritus, n/v/d/abd pain, CP/SOB/MUELLER, neuropathy, rash or any other complaints at this time.    PMH/PSH: HTN, HL, tonsillectomy.   FH: mother dies of bladder cancer (smoker)  Soc Hx: Every day smoker, denies ETOH, illicit drugs; Retired ;  with children.    History of Present Illness:  Patient presents for follow up accompanied by his wife. Notes that he has recurrent pilonidal cyst/abscess, has drained on its own. Notes tenderness and pain at site. Continues to smoke 1.5 pack per day. Otherwise doing well.     Review of Systems: All of the systems have been reviewed and are negative for complaints except what is stated in the HPI and/or past medical history.    Allergies and Intolerances:  No Known Allergies         Outpatient Medication Profile:  Current Outpatient Medications   Medication Sig Dispense Refill    hydroCHLOROthiazide (HYDRODiuril) 25 mg tablet Take 1 tablet (25 mg) by mouth once daily. 90 tablet 1    potassium chloride CR 20 mEq ER tablet Take 1 tablet (20 mEq) by mouth once daily. 90 tablet 1    albuterol 90 mcg/actuation inhaler Inhale 2 puffs every 4 hours if needed for wheezing. 18 g 0    nicotine (Nicoderm CQ) 14 mg/24 hr patch Place 1 patch over 24 hours on the skin once every 24 hours. 30 patch 0    rosuvastatin (Crestor) 40 mg tablet Take 1 tablet (40 mg) by mouth once daily. (Patient not taking: Reported on 3/25/2025) 100 tablet  "3     No current facility-administered medications for this visit.        Vitals and Measurements:       5/1/2024     2:44 PM 7/8/2024     9:07 AM 7/25/2024    12:02 PM 8/8/2024    12:41 PM 9/5/2024    10:14 AM 9/12/2024     3:11 PM 3/25/2025    11:50 AM   Vitals   Systolic 132 128 137 149 122 158 145   Diastolic 76 65 91 103 68 97 99   BP Location   Left arm Left arm   Left arm   Heart Rate 78 72 88 87  82 92   Temp   36.4 °C (97.5 °F) 36.9 °C (98.4 °F)  37 °C (98.6 °F) 36.8 °C (98.2 °F)   Resp   16 17  20 17   Height   1.75 m (5' 8.9\")    1.753 m (5' 9\")    Weight (lb)   245.15 244.71  242 251.32   BMI   36.31 kg/m2 36.24 kg/m2  35.74 kg/m2 37.11 kg/m2   BSA (m2)   2.32 m2 2.32 m2  2.31 m2 2.36 m2   Visit Report Report Report Report Report Report  Report       Significant value     Physical Exam:   Constitutional: alert, awake and oriented, not in acute distress   HEENT: moist mucous membranes, normal nose   Neck: supple, no lymphadenopathy   EYES: PERRL, EOM intact, conjunctiva normal  Skin: no jaundice, rash or erythema  Neurological: AAOx3, no gross focal deficit   Psychiatric: normal mood and behavior     Lab Results:  Office Visit on 03/25/2025   Component Date Value Ref Range Status    WBC 03/25/2025 13.5 (H)  4.4 - 11.3 x10*3/uL Final    RBC 03/25/2025 5.87  4.50 - 5.90 x10*6/uL Final    Hemoglobin 03/25/2025 18.3 (H)  13.5 - 17.5 g/dL Final    Hematocrit 03/25/2025 53.8 (H)  41.0 - 52.0 % Final    MCV 03/25/2025 92  80 - 100 fL Final    MCH 03/25/2025 31.2  26.0 - 34.0 pg Final    MCHC 03/25/2025 34.0  32.0 - 36.0 g/dL Final    RDW 03/25/2025 14.4  11.5 - 14.5 % Final    Platelets 03/25/2025 266  150 - 450 x10*3/uL Final    Neutrophils % 03/25/2025 49.9  40.0 - 80.0 % Final    Immature Granulocytes %, Automated 03/25/2025 1.0 (H)  0.0 - 0.9 % Final    Immature Granulocyte Count (IG) includes promyelocytes, myelocytes and metamyelocytes but does not include bands. Percent differential counts (%) should be " interpreted in the context of the absolute cell counts (cells/UL).    Lymphocytes % 03/25/2025 35.7  13.0 - 44.0 % Final    Monocytes % 03/25/2025 9.7  2.0 - 10.0 % Final    Eosinophils % 03/25/2025 3.0  0.0 - 6.0 % Final    Basophils % 03/25/2025 0.7  0.0 - 2.0 % Final    Neutrophils Absolute 03/25/2025 6.72  1.20 - 7.70 x10*3/uL Final    Percent differential counts (%) should be interpreted in the context of the absolute cell counts (cells/uL).    Immature Granulocytes Absolute, Au* 03/25/2025 0.13  0.00 - 0.70 x10*3/uL Final    Lymphocytes Absolute 03/25/2025 4.81 (H)  1.20 - 4.80 x10*3/uL Final    Monocytes Absolute 03/25/2025 1.30 (H)  0.10 - 1.00 x10*3/uL Final    Eosinophils Absolute 03/25/2025 0.41  0.00 - 0.70 x10*3/uL Final    Basophils Absolute 03/25/2025 0.10  0.00 - 0.10 x10*3/uL Final     Assessment:    63 y.o. male with medical history s/f every day smoker referred for polycythemia.    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 Discussed having first degree relatives tested.    Plan:    Reviewed and discussed lab, imaging, and pathology results with patient in detail as well as diagnosis, prognosis, and treatment options.    Discussed polycythemia 2/2 to smoking encourage smoking cessation. Willing to try Wellbutrin for smoking cessation. Send for low dose lung CT.    Rx for keflex for skin infection with pilonidal cyst/abscess.      F/U w/PCP    RTC in 6 months     Patient verbalized understanding, and all his questions were answered to his satisfaction    30 min spent with patient greater than 50 % of which was spent in consultation and coordination of care.

## 2025-04-08 ENCOUNTER — HOSPITAL ENCOUNTER (OUTPATIENT)
Dept: RADIOLOGY | Facility: CLINIC | Age: 65
Discharge: HOME | End: 2025-04-08
Payer: MEDICARE

## 2025-04-08 DIAGNOSIS — F17.200 TOBACCO DEPENDENCE: ICD-10-CM

## 2025-04-08 PROCEDURE — 71271 CT THORAX LUNG CANCER SCR C-: CPT

## 2025-04-08 PROCEDURE — 71271 CT THORAX LUNG CANCER SCR C-: CPT | Performed by: RADIOLOGY

## 2025-04-19 DIAGNOSIS — I10 PRIMARY HYPERTENSION: ICD-10-CM

## 2025-04-19 RX ORDER — POTASSIUM CHLORIDE 20 MEQ/1
20 TABLET, EXTENDED RELEASE ORAL DAILY
Qty: 90 TABLET | Refills: 1 | Status: SHIPPED | OUTPATIENT
Start: 2025-04-19

## 2025-04-19 RX ORDER — HYDROCHLOROTHIAZIDE 25 MG/1
25 TABLET ORAL DAILY
Qty: 90 TABLET | Refills: 1 | Status: SHIPPED | OUTPATIENT
Start: 2025-04-19

## 2025-07-15 ENCOUNTER — APPOINTMENT (OUTPATIENT)
Dept: PRIMARY CARE | Facility: CLINIC | Age: 65
End: 2025-07-15
Payer: MEDICARE

## 2025-07-15 VITALS
SYSTOLIC BLOOD PRESSURE: 132 MMHG | RESPIRATION RATE: 15 BRPM | WEIGHT: 240 LBS | BODY MASS INDEX: 35.55 KG/M2 | DIASTOLIC BLOOD PRESSURE: 66 MMHG | HEART RATE: 68 BPM | HEIGHT: 69 IN

## 2025-07-15 DIAGNOSIS — B35.1 FUNGAL TOENAIL INFECTION: ICD-10-CM

## 2025-07-15 DIAGNOSIS — E78.00 PURE HYPERCHOLESTEROLEMIA: Primary | ICD-10-CM

## 2025-07-15 DIAGNOSIS — Z12.11 COLON CANCER SCREENING: ICD-10-CM

## 2025-07-15 DIAGNOSIS — I10 PRIMARY HYPERTENSION: ICD-10-CM

## 2025-07-15 DIAGNOSIS — E66.01 MORBID OBESITY (MULTI): ICD-10-CM

## 2025-07-15 DIAGNOSIS — Z00.00 ROUTINE MEDICAL EXAM: ICD-10-CM

## 2025-07-15 PROCEDURE — 3078F DIAST BP <80 MM HG: CPT | Performed by: FAMILY MEDICINE

## 2025-07-15 PROCEDURE — 99214 OFFICE O/P EST MOD 30 MIN: CPT | Performed by: FAMILY MEDICINE

## 2025-07-15 PROCEDURE — 1160F RVW MEDS BY RX/DR IN RCRD: CPT | Performed by: FAMILY MEDICINE

## 2025-07-15 PROCEDURE — G0402 INITIAL PREVENTIVE EXAM: HCPCS | Performed by: FAMILY MEDICINE

## 2025-07-15 PROCEDURE — 1159F MED LIST DOCD IN RCRD: CPT | Performed by: FAMILY MEDICINE

## 2025-07-15 PROCEDURE — 3075F SYST BP GE 130 - 139MM HG: CPT | Performed by: FAMILY MEDICINE

## 2025-07-15 PROCEDURE — 3008F BODY MASS INDEX DOCD: CPT | Performed by: FAMILY MEDICINE

## 2025-07-15 PROCEDURE — G2211 COMPLEX E/M VISIT ADD ON: HCPCS | Performed by: FAMILY MEDICINE

## 2025-07-15 PROCEDURE — 1170F FXNL STATUS ASSESSED: CPT | Performed by: FAMILY MEDICINE

## 2025-07-15 RX ORDER — HYDROCHLOROTHIAZIDE 25 MG/1
25 TABLET ORAL DAILY
Qty: 90 TABLET | Refills: 1 | Status: SHIPPED | OUTPATIENT
Start: 2025-07-15

## 2025-07-15 RX ORDER — TERBINAFINE HYDROCHLORIDE 250 MG/1
250 TABLET ORAL DAILY
Qty: 84 TABLET | Refills: 0 | Status: SHIPPED | OUTPATIENT
Start: 2025-07-15

## 2025-07-15 RX ORDER — POTASSIUM CHLORIDE 20 MEQ/1
20 TABLET, EXTENDED RELEASE ORAL DAILY
Qty: 90 TABLET | Refills: 1 | Status: SHIPPED | OUTPATIENT
Start: 2025-07-15

## 2025-07-15 ASSESSMENT — PATIENT HEALTH QUESTIONNAIRE - PHQ9
1. LITTLE INTEREST OR PLEASURE IN DOING THINGS: NOT AT ALL
SUM OF ALL RESPONSES TO PHQ9 QUESTIONS 1 AND 2: 0
2. FEELING DOWN, DEPRESSED OR HOPELESS: NOT AT ALL

## 2025-07-15 ASSESSMENT — ACTIVITIES OF DAILY LIVING (ADL)
TAKING_MEDICATION: INDEPENDENT
DRESSING: INDEPENDENT
DOING_HOUSEWORK: INDEPENDENT
BATHING: INDEPENDENT
GROCERY_SHOPPING: INDEPENDENT
MANAGING_FINANCES: INDEPENDENT

## 2025-07-15 ASSESSMENT — VISUAL ACUITY
OS_CC: 20/20
OD_CC: 20/20

## 2025-07-15 NOTE — ASSESSMENT & PLAN NOTE
Recommend decrease in calorie intake, regular aerobic exercise with low fat and low cholesterol diet. Will monitor weight, blood glucose and cholesterol regularly. Recommend  bariatric evaluation and nutritionist evaluation. Pt declined          24-Sep-2017 13:38

## 2025-07-15 NOTE — PROGRESS NOTES
"Subjective   Patient ID: Jeb Lobo is a 64 y.o. male who presents for Welcome To Medicare (fu).    HPI   Patient has been compliant with taking all  current medications except crestor. Persistent toenail fungus for yo and were not resolved by otc meds. No blurry vision. Normal appetite. No CP, HA, dizziness, heart palpitation. No claudication or cold LE.  No LE edema. No imbalance or falls. Good mood.     Review of Systems    Objective   /66   Pulse 68   Resp 15   Ht 1.753 m (5' 9\")   Wt 109 kg (240 lb)   BMI 35.44 kg/m²     Physical Exam  NAD, well groomed, No sclera icterus.  lungs: CTA b/l, heart: RRR, No LE edema, normal pedal pulses, abd: soft, no tenderness, BS+, + toenail fungus at b/l toenails. Good balance. CNII-XII were grossly intact, good judgment and memory. No depressed mood.    Assessment/Plan   Assessment & Plan  Pure hypercholesterolemia  Hyperlipidemia not on statin.  Will monitor labs for evaluation.  Health diet and regular exercise. Decrease calorie intake to lose wt.  f/u in 3 mos.     Orders:    Lipid Panel; Future    Comprehensive Metabolic Panel; Future    Primary hypertension  BP has been controlled. Continue BP pills. keep a daily  bp log and bring in the log at the next office visit. Call office if BP is persistently over 130/80. DASH diet and regular exercise. Decrease calorie intake to lose wt.  Fu in 6 mos    Orders:    Lipid Panel; Future    Comprehensive Metabolic Panel; Future    hydroCHLOROthiazide (HYDRODiuril) 25 mg tablet; Take 1 tablet (25 mg) by mouth once daily.    potassium chloride CR 20 mEq ER tablet; Take 1 tablet (20 mEq) by mouth once daily.    Colon cancer screening    Orders:    Fecal Occult Blood Immunoassay; Future    Fungal toenail infection  terbinafine as dir. Caution of se of liver damage. No eoth. Will monitor LFTs  Orders:    terbinafine (LamISIL) 250 mg tablet; Take 1 tablet (250 mg) by mouth once daily.    Morbid obesity (Multi)  Recommend " decrease in calorie intake, regular aerobic exercise with low fat and low cholesterol diet. Will monitor weight, blood glucose and cholesterol regularly. Recommend  bariatric evaluation and nutritionist evaluation. Pt declined         Routine medical exam [Z00.00]  Medicare welcome visit: pt was capable of performing all ADLs and IADLs. Pt has good memory and cognitive function. pt is morbidly obese. Recommend healthy diet and regular exercise.  Advise eye exam by an OD yearly for glaucoma screen and dental exam every 6 months. check lipids.   will monitor blood pressure, cholesterol levels and weight regularly. Recommend sunscreen application if exposed to the sun when the UV index is over 2. Recommend vaccine shots as indicated in the patient's Care Gaps in Epic.   recommend cologuard, HIV, hep C tests, Colonoscopy, LDCT, abdominal aorta US for screening of AAA,   pt declined    Recommend pt to clear clutters on floor at home to prevent falls. recommend to update living will and DPOA  stop smoking cigarettes. pt  had been taking all current  medications as prescribed.

## 2025-07-15 NOTE — ASSESSMENT & PLAN NOTE
BP has been controlled. Continue BP pills. keep a daily  bp log and bring in the log at the next office visit. Call office if BP is persistently over 130/80. DASH diet and regular exercise. Decrease calorie intake to lose wt.  Fu in 6 mos    Orders:    Lipid Panel; Future    Comprehensive Metabolic Panel; Future    hydroCHLOROthiazide (HYDRODiuril) 25 mg tablet; Take 1 tablet (25 mg) by mouth once daily.    potassium chloride CR 20 mEq ER tablet; Take 1 tablet (20 mEq) by mouth once daily.

## 2025-07-15 NOTE — ASSESSMENT & PLAN NOTE
Hyperlipidemia not on statin.  Will monitor labs for evaluation.  Health diet and regular exercise. Decrease calorie intake to lose wt.  f/u in 3 mos.     Orders:    Lipid Panel; Future    Comprehensive Metabolic Panel; Future

## 2025-07-15 NOTE — ASSESSMENT & PLAN NOTE
terbinafine as dir. Caution of se of liver damage. No eoth. Will monitor LFTs  Orders:    terbinafine (LamISIL) 250 mg tablet; Take 1 tablet (250 mg) by mouth once daily.

## 2025-09-25 ENCOUNTER — APPOINTMENT (OUTPATIENT)
Dept: HEMATOLOGY/ONCOLOGY | Facility: CLINIC | Age: 65
End: 2025-09-25
Payer: MEDICARE